# Patient Record
Sex: MALE | Race: WHITE | Employment: OTHER | URBAN - METROPOLITAN AREA
[De-identification: names, ages, dates, MRNs, and addresses within clinical notes are randomized per-mention and may not be internally consistent; named-entity substitution may affect disease eponyms.]

---

## 2018-04-05 PROBLEM — J44.9 CHRONIC OBSTRUCTIVE PULMONARY DISEASE, UNSPECIFIED COPD TYPE (HCC): Status: ACTIVE | Noted: 2018-04-05

## 2018-05-03 PROBLEM — E55.9 VITAMIN D DEFICIENCY: Status: ACTIVE | Noted: 2018-05-03

## 2018-05-03 PROBLEM — R97.20 ELEVATED PSA: Status: ACTIVE | Noted: 2018-05-03

## 2018-05-03 PROCEDURE — 81003 URINALYSIS AUTO W/O SCOPE: CPT | Performed by: INTERNAL MEDICINE

## 2018-07-09 ENCOUNTER — LAB ENCOUNTER (OUTPATIENT)
Dept: LAB | Facility: HOSPITAL | Age: 68
End: 2018-07-09
Attending: INTERNAL MEDICINE
Payer: MEDICARE

## 2018-07-09 DIAGNOSIS — K29.00 GASTRITIS, ACUTE: ICD-10-CM

## 2018-07-09 DIAGNOSIS — D64.9 ANEMIA, UNSPECIFIED: ICD-10-CM

## 2018-07-09 DIAGNOSIS — R10.13 EPIGASTRIC PAIN: ICD-10-CM

## 2018-07-09 LAB — LIPASE SERPL-CCNC: 39 U/L (ref 22–51)

## 2018-07-09 PROCEDURE — 83690 ASSAY OF LIPASE: CPT

## 2018-07-09 PROCEDURE — 36415 COLL VENOUS BLD VENIPUNCTURE: CPT

## 2018-07-10 PROBLEM — M54.41 MIDLINE LOW BACK PAIN WITH BILATERAL SCIATICA, UNSPECIFIED CHRONICITY: Status: ACTIVE | Noted: 2018-07-10

## 2018-07-10 PROBLEM — R29.898 WEAKNESS OF BOTH LOWER EXTREMITIES: Status: ACTIVE | Noted: 2018-07-10

## 2018-07-10 PROBLEM — M54.42 MIDLINE LOW BACK PAIN WITH BILATERAL SCIATICA, UNSPECIFIED CHRONICITY: Status: ACTIVE | Noted: 2018-07-10

## 2018-08-04 ENCOUNTER — LAB ENCOUNTER (OUTPATIENT)
Dept: LAB | Facility: HOSPITAL | Age: 68
End: 2018-08-04
Attending: INTERNAL MEDICINE
Payer: MEDICARE

## 2018-08-04 DIAGNOSIS — R19.4 CHANGE IN BOWEL HABITS: ICD-10-CM

## 2018-08-04 LAB
CRYPTOSP AG STL QL IA: NEGATIVE
G LAMBLIA AG STL QL IA: NEGATIVE

## 2018-08-04 PROCEDURE — 87272 CRYPTOSPORIDIUM AG IF: CPT

## 2018-08-04 PROCEDURE — 87329 GIARDIA AG IA: CPT

## 2018-08-06 NOTE — PROGRESS NOTES
8/6/2018  Anabelle Pedro  [address] [facility] [place] [address]    Dear Federico Domingo,       Here are your results from your recent visit with Gastroenterology. Stool for giardia and crytosporidium is negative. Follow up as discussed.

## 2018-08-07 ENCOUNTER — HOSPITAL ENCOUNTER (OUTPATIENT)
Facility: HOSPITAL | Age: 68
Setting detail: HOSPITAL OUTPATIENT SURGERY
Discharge: HOME | End: 2018-08-07
Attending: INTERNAL MEDICINE | Admitting: INTERNAL MEDICINE
Payer: MEDICARE

## 2018-08-07 ENCOUNTER — ANESTHESIA EVENT (OUTPATIENT)
Dept: ENDOSCOPY | Facility: HOSPITAL | Age: 68
End: 2018-08-07
Payer: MEDICARE

## 2018-08-07 ENCOUNTER — SURGERY (OUTPATIENT)
Age: 68
End: 2018-08-07

## 2018-08-07 ENCOUNTER — ANESTHESIA (OUTPATIENT)
Dept: ENDOSCOPY | Facility: HOSPITAL | Age: 68
End: 2018-08-07
Payer: MEDICARE

## 2018-08-07 DIAGNOSIS — K21.9 GASTROESOPHAGEAL REFLUX DISEASE WITHOUT ESOPHAGITIS: Primary | ICD-10-CM

## 2018-08-07 DIAGNOSIS — Z12.11 SPECIAL SCREENING FOR MALIGNANT NEOPLASM OF COLON: ICD-10-CM

## 2018-08-07 DIAGNOSIS — R68.81 EARLY SATIETY: ICD-10-CM

## 2018-08-07 DIAGNOSIS — K63.5 POLYP OF COLON, UNSPECIFIED PART OF COLON, UNSPECIFIED TYPE: ICD-10-CM

## 2018-08-07 DIAGNOSIS — R10.13 EPIGASTRIC PAIN: ICD-10-CM

## 2018-08-07 DIAGNOSIS — R19.4 CHANGE IN BOWEL HABITS: ICD-10-CM

## 2018-08-07 PROCEDURE — 88305 TISSUE EXAM BY PATHOLOGIST: CPT | Performed by: INTERNAL MEDICINE

## 2018-08-07 PROCEDURE — 3E0H8GC INTRODUCTION OF OTHER THERAPEUTIC SUBSTANCE INTO LOWER GI, VIA NATURAL OR ARTIFICIAL OPENING ENDOSCOPIC: ICD-10-PCS | Performed by: INTERNAL MEDICINE

## 2018-08-07 PROCEDURE — 0DBK8ZX EXCISION OF ASCENDING COLON, VIA NATURAL OR ARTIFICIAL OPENING ENDOSCOPIC, DIAGNOSTIC: ICD-10-PCS | Performed by: INTERNAL MEDICINE

## 2018-08-07 PROCEDURE — 88312 SPECIAL STAINS GROUP 1: CPT | Performed by: INTERNAL MEDICINE

## 2018-08-07 PROCEDURE — 0DBB8ZX EXCISION OF ILEUM, VIA NATURAL OR ARTIFICIAL OPENING ENDOSCOPIC, DIAGNOSTIC: ICD-10-PCS | Performed by: INTERNAL MEDICINE

## 2018-08-07 PROCEDURE — 0DB68ZX EXCISION OF STOMACH, VIA NATURAL OR ARTIFICIAL OPENING ENDOSCOPIC, DIAGNOSTIC: ICD-10-PCS | Performed by: INTERNAL MEDICINE

## 2018-08-07 PROCEDURE — 0DBN8ZX EXCISION OF SIGMOID COLON, VIA NATURAL OR ARTIFICIAL OPENING ENDOSCOPIC, DIAGNOSTIC: ICD-10-PCS | Performed by: INTERNAL MEDICINE

## 2018-08-07 PROCEDURE — 0DBM8ZX EXCISION OF DESCENDING COLON, VIA NATURAL OR ARTIFICIAL OPENING ENDOSCOPIC, DIAGNOSTIC: ICD-10-PCS | Performed by: INTERNAL MEDICINE

## 2018-08-07 PROCEDURE — 0DB98ZX EXCISION OF DUODENUM, VIA NATURAL OR ARTIFICIAL OPENING ENDOSCOPIC, DIAGNOSTIC: ICD-10-PCS | Performed by: INTERNAL MEDICINE

## 2018-08-07 PROCEDURE — 0DB48ZX EXCISION OF ESOPHAGOGASTRIC JUNCTION, VIA NATURAL OR ARTIFICIAL OPENING ENDOSCOPIC, DIAGNOSTIC: ICD-10-PCS | Performed by: INTERNAL MEDICINE

## 2018-08-07 PROCEDURE — 0DBL8ZX EXCISION OF TRANSVERSE COLON, VIA NATURAL OR ARTIFICIAL OPENING ENDOSCOPIC, DIAGNOSTIC: ICD-10-PCS | Performed by: INTERNAL MEDICINE

## 2018-08-07 PROCEDURE — 0DBE8ZX EXCISION OF LARGE INTESTINE, VIA NATURAL OR ARTIFICIAL OPENING ENDOSCOPIC, DIAGNOSTIC: ICD-10-PCS | Performed by: INTERNAL MEDICINE

## 2018-08-07 RX ORDER — LIDOCAINE HYDROCHLORIDE 10 MG/ML
INJECTION, SOLUTION EPIDURAL; INFILTRATION; INTRACAUDAL; PERINEURAL AS NEEDED
Status: DISCONTINUED | OUTPATIENT
Start: 2018-08-07 | End: 2018-08-07 | Stop reason: SURG

## 2018-08-07 RX ORDER — SODIUM CHLORIDE, SODIUM LACTATE, POTASSIUM CHLORIDE, CALCIUM CHLORIDE 600; 310; 30; 20 MG/100ML; MG/100ML; MG/100ML; MG/100ML
INJECTION, SOLUTION INTRAVENOUS CONTINUOUS
Status: DISCONTINUED | OUTPATIENT
Start: 2018-08-07 | End: 2018-08-07

## 2018-08-07 RX ADMIN — SODIUM CHLORIDE, SODIUM LACTATE, POTASSIUM CHLORIDE, CALCIUM CHLORIDE: 600; 310; 30; 20 INJECTION, SOLUTION INTRAVENOUS at 11:33:00

## 2018-08-07 RX ADMIN — LIDOCAINE HYDROCHLORIDE 20 MG: 10 INJECTION, SOLUTION EPIDURAL; INFILTRATION; INTRACAUDAL; PERINEURAL at 10:39:00

## 2018-08-07 RX ADMIN — SODIUM CHLORIDE, SODIUM LACTATE, POTASSIUM CHLORIDE, CALCIUM CHLORIDE: 600; 310; 30; 20 INJECTION, SOLUTION INTRAVENOUS at 10:37:00

## 2018-08-07 NOTE — ANESTHESIA POSTPROCEDURE EVALUATION
Patient: Emily Stone    Procedure Summary     Date:  08/07/18 Room / Location:  [address] [place] [address] ENDOSCOPY 01 / [address] [place] [address] ENDOSCOPY    Anesthesia Start:  8655 Anesthesia Stop:  1503    Procedures:       COLONOSCOPY (N/A )      ESOPHAGOGASTRODUODENOSCOPY (EGD) (N/A

## 2018-08-07 NOTE — OPERATIVE REPORT
COLONOSCOPY AND ESOPHAGOGASTRODUODENOSCOPY REPORT    Patient Name:  Osito Acosta  Medical Record #: [id number]  YOB: 1950  Date of Procedure: 8/7/2018    Referring physician: Jannie Warner MD    Surgeon: Marcelle Martinez MD second portion of the duodenum. Upon withdrawal of the scope, a retroflexed maneuver was performed to visualize the gastric angulus and cardia. Findings: The esophagus was noted to have distal erythema and irregular z line and biopsies were taken.  No

## 2018-08-07 NOTE — H&P
PRE-PROCEDURE UPDATE    HPI: Kym Colindres is a 79year old male. 8/8/1950. Patient presents for a colonoscopy and gastroscopy. ALLERGIES: No Known Allergies      No current outpatient prescriptions on file.   Past Medical History:   Diagnos

## 2018-08-07 NOTE — ANESTHESIA PREPROCEDURE EVALUATION
Anesthesia PreOp Note    HPI:     Anabelle Pedro is a 79year old male who presents for preoperative consultation requested by: Madyson Nazario MD    Date of Surgery: 8/7/2018    Procedure(s):  COLONOSCOPY  ESOPHAGOGASTRODUODENOSCOPY (EGD)  Camelia lisinopril 20 MG Oral Tab Take 1 tablet (20 mg total) by mouth daily. Disp: 90 tablet Rfl: 0 8/7/2018   aspirin 81 MG Oral Tab Take 81 mg by mouth daily. Disp:  Rfl:  8/6/2018   metoprolol Tartrate 25 MG Oral Tab Take 25 mg by mouth daily.  Disp:  Rfl:  8/7 Patient summary reviewed    No history of anesthetic complications   Airway   Mallampati: III  TM distance: >3 FB  Neck ROM: full  Dental          Pulmonary    (+) decreased breath sounds,   (-) COPD  Cardiovascular   (+) hypertension well controlled, pas

## 2018-08-08 VITALS
DIASTOLIC BLOOD PRESSURE: 73 MMHG | HEIGHT: 73 IN | SYSTOLIC BLOOD PRESSURE: 128 MMHG | HEART RATE: 70 BPM | RESPIRATION RATE: 15 BRPM | WEIGHT: 220 LBS | OXYGEN SATURATION: 99 % | BODY MASS INDEX: 29.16 KG/M2

## 2018-08-09 NOTE — PROGRESS NOTES
8/9/2018  Love Marcum  [phone] [address]    Dear Prerna De La O,   I called and left a message to call me back as soon as you can today.   [phone]      Here are the biopsy/pathology findings from your recent EGD (upper

## 2018-09-21 ENCOUNTER — HOSPITAL ENCOUNTER (OUTPATIENT)
Facility: HOSPITAL | Age: 68
Setting detail: HOSPITAL OUTPATIENT SURGERY
Discharge: HOME | End: 2018-09-21
Attending: INTERNAL MEDICINE | Admitting: INTERNAL MEDICINE
Payer: MEDICARE

## 2018-09-21 VITALS
HEART RATE: 61 BPM | HEIGHT: 73 IN | BODY MASS INDEX: 28.36 KG/M2 | DIASTOLIC BLOOD PRESSURE: 73 MMHG | RESPIRATION RATE: 17 BRPM | WEIGHT: 214 LBS | SYSTOLIC BLOOD PRESSURE: 132 MMHG | OXYGEN SATURATION: 95 %

## 2018-09-21 DIAGNOSIS — C18.7 MALIGNANT NEOPLASM OF SIGMOID COLON (HCC): ICD-10-CM

## 2018-09-21 PROCEDURE — 0DJD8ZZ INSPECTION OF LOWER INTESTINAL TRACT, VIA NATURAL OR ARTIFICIAL OPENING ENDOSCOPIC: ICD-10-PCS | Performed by: INTERNAL MEDICINE

## 2018-09-21 RX ORDER — SODIUM CHLORIDE 0.9 % (FLUSH) 0.9 %
10 SYRINGE (ML) INJECTION AS NEEDED
Status: DISCONTINUED | OUTPATIENT
Start: 2018-09-21 | End: 2018-09-21

## 2018-09-21 RX ORDER — SODIUM CHLORIDE, SODIUM LACTATE, POTASSIUM CHLORIDE, CALCIUM CHLORIDE 600; 310; 30; 20 MG/100ML; MG/100ML; MG/100ML; MG/100ML
INJECTION, SOLUTION INTRAVENOUS CONTINUOUS
Status: DISCONTINUED | OUTPATIENT
Start: 2018-09-21 | End: 2018-09-21

## 2018-09-21 NOTE — H&P
PRE-PROCEDURE HISTORY & PHYSICAL    HPI: Christian Jha is a 76year old male. 8/8/1950.     Patient presents for a Flexible Sigmoidoscopy with EUS for rectal polyp with CIS  ALLERGIES: No Known Allergies      No current outpatient medications on fi

## 2018-09-21 NOTE — OPERATIVE REPORT
SIGMOIDOSCOPY REPORT    Patient Name:  Rylan Ren  Medical Record #: [id number]  YOB: 1950  Date of Procedure: 9/21/2018    Referring physician: Ramon Price MD     Flexible sigmoidoscopy to 40 cm with EUS    Surgeon:  Aileen Cardona

## 2018-10-12 PROCEDURE — 87338 HPYLORI STOOL AG IA: CPT | Performed by: INTERNAL MEDICINE

## 2018-10-19 PROBLEM — C19 CARCINOMA OF RECTOSIGMOID (COLON) (HCC): Status: ACTIVE | Noted: 2018-10-19

## 2018-11-20 PROCEDURE — 87493 C DIFF AMPLIFIED PROBE: CPT | Performed by: SURGERY

## 2019-01-11 ENCOUNTER — HOSPITAL (OUTPATIENT)
Dept: OTHER | Age: 69
End: 2019-01-11
Attending: INTERNAL MEDICINE

## 2019-01-17 LAB — PATHOLOGIST NAME: NORMAL

## 2019-05-23 PROBLEM — I25.10 CORONARY ARTERY DISEASE INVOLVING NATIVE HEART WITHOUT ANGINA PECTORIS, UNSPECIFIED VESSEL OR LESION TYPE: Status: ACTIVE | Noted: 2019-05-23

## 2019-06-13 PROBLEM — N17.9 AKI (ACUTE KIDNEY INJURY) (HCC): Status: ACTIVE | Noted: 2019-06-13

## 2019-06-13 PROBLEM — N17.9 AKI (ACUTE KIDNEY INJURY): Status: ACTIVE | Noted: 2019-06-13

## 2019-06-13 PROBLEM — Z93.2 ILEOSTOMY PRESENT (HCC): Status: ACTIVE | Noted: 2019-06-13

## 2019-06-13 PROCEDURE — 84300 ASSAY OF URINE SODIUM: CPT | Performed by: INTERNAL MEDICINE

## 2019-06-21 ENCOUNTER — HOSPITAL ENCOUNTER (INPATIENT)
Facility: HOSPITAL | Age: 69
LOS: 3 days | Discharge: HOME HEALTH | DRG: 683 | End: 2019-06-24
Attending: EMERGENCY MEDICINE | Admitting: HOSPITALIST
Payer: MEDICARE

## 2019-06-21 ENCOUNTER — APPOINTMENT (OUTPATIENT)
Dept: CT IMAGING | Facility: HOSPITAL | Age: 69
DRG: 683 | End: 2019-06-21
Attending: INTERNAL MEDICINE
Payer: MEDICARE

## 2019-06-21 ENCOUNTER — APPOINTMENT (OUTPATIENT)
Dept: ULTRASOUND IMAGING | Facility: HOSPITAL | Age: 69
DRG: 683 | End: 2019-06-21
Attending: EMERGENCY MEDICINE
Payer: MEDICARE

## 2019-06-21 DIAGNOSIS — N30.00 ACUTE CYSTITIS WITHOUT HEMATURIA: Primary | ICD-10-CM

## 2019-06-21 PROCEDURE — 84540 ASSAY OF URINE/UREA-N: CPT | Performed by: INTERNAL MEDICINE

## 2019-06-21 PROCEDURE — 80048 BASIC METABOLIC PNL TOTAL CA: CPT | Performed by: EMERGENCY MEDICINE

## 2019-06-21 PROCEDURE — 87086 URINE CULTURE/COLONY COUNT: CPT | Performed by: EMERGENCY MEDICINE

## 2019-06-21 PROCEDURE — 99285 EMERGENCY DEPT VISIT HI MDM: CPT

## 2019-06-21 PROCEDURE — 87040 BLOOD CULTURE FOR BACTERIA: CPT | Performed by: HOSPITALIST

## 2019-06-21 PROCEDURE — 81001 URINALYSIS AUTO W/SCOPE: CPT | Performed by: EMERGENCY MEDICINE

## 2019-06-21 PROCEDURE — 96374 THER/PROPH/DIAG INJ IV PUSH: CPT

## 2019-06-21 PROCEDURE — 82570 ASSAY OF URINE CREATININE: CPT | Performed by: INTERNAL MEDICINE

## 2019-06-21 PROCEDURE — 84300 ASSAY OF URINE SODIUM: CPT | Performed by: INTERNAL MEDICINE

## 2019-06-21 PROCEDURE — 85025 COMPLETE CBC W/AUTO DIFF WBC: CPT | Performed by: EMERGENCY MEDICINE

## 2019-06-21 PROCEDURE — 76770 US EXAM ABDO BACK WALL COMP: CPT | Performed by: EMERGENCY MEDICINE

## 2019-06-21 PROCEDURE — 74176 CT ABD & PELVIS W/O CONTRAST: CPT | Performed by: INTERNAL MEDICINE

## 2019-06-21 PROCEDURE — 83605 ASSAY OF LACTIC ACID: CPT | Performed by: HOSPITALIST

## 2019-06-21 RX ORDER — ALPRAZOLAM 0.25 MG/1
0.25 TABLET ORAL 2 TIMES DAILY PRN
Status: DISCONTINUED | OUTPATIENT
Start: 2019-06-21 | End: 2019-06-24

## 2019-06-21 RX ORDER — BISACODYL 10 MG
10 SUPPOSITORY, RECTAL RECTAL
Status: DISCONTINUED | OUTPATIENT
Start: 2019-06-21 | End: 2019-06-24

## 2019-06-21 RX ORDER — SODIUM CHLORIDE 9 MG/ML
125 INJECTION, SOLUTION INTRAVENOUS CONTINUOUS
Status: DISCONTINUED | OUTPATIENT
Start: 2019-06-21 | End: 2019-06-21

## 2019-06-21 RX ORDER — SODIUM CHLORIDE 0.9 % (FLUSH) 0.9 %
3 SYRINGE (ML) INJECTION AS NEEDED
Status: DISCONTINUED | OUTPATIENT
Start: 2019-06-21 | End: 2019-06-24

## 2019-06-21 RX ORDER — ATORVASTATIN CALCIUM 40 MG/1
40 TABLET, FILM COATED ORAL NIGHTLY
Status: DISCONTINUED | OUTPATIENT
Start: 2019-06-21 | End: 2019-06-24

## 2019-06-21 RX ORDER — PANTOPRAZOLE SODIUM 40 MG/1
40 TABLET, DELAYED RELEASE ORAL
Status: DISCONTINUED | OUTPATIENT
Start: 2019-06-21 | End: 2019-06-24

## 2019-06-21 RX ORDER — DIPHENOXYLATE HYDROCHLORIDE AND ATROPINE SULFATE 2.5; .025 MG/1; MG/1
1 TABLET ORAL EVERY 6 HOURS PRN
Status: DISCONTINUED | OUTPATIENT
Start: 2019-06-21 | End: 2019-06-24

## 2019-06-21 RX ORDER — DOCUSATE SODIUM 100 MG/1
100 CAPSULE, LIQUID FILLED ORAL 2 TIMES DAILY
Status: DISCONTINUED | OUTPATIENT
Start: 2019-06-21 | End: 2019-06-24

## 2019-06-21 RX ORDER — POLYETHYLENE GLYCOL 3350 17 G/17G
17 POWDER, FOR SOLUTION ORAL DAILY PRN
Status: DISCONTINUED | OUTPATIENT
Start: 2019-06-21 | End: 2019-06-24

## 2019-06-21 RX ORDER — METOCLOPRAMIDE HYDROCHLORIDE 5 MG/ML
10 INJECTION INTRAMUSCULAR; INTRAVENOUS EVERY 8 HOURS PRN
Status: DISCONTINUED | OUTPATIENT
Start: 2019-06-21 | End: 2019-06-24

## 2019-06-21 RX ORDER — DIPHENOXYLATE HYDROCHLORIDE AND ATROPINE SULFATE 2.5; .025 MG/1; MG/1
1 TABLET ORAL EVERY 6 HOURS PRN
Refills: 2 | COMMUNITY
Start: 2019-06-07 | End: 2019-10-25

## 2019-06-21 RX ORDER — HEPARIN SODIUM 5000 [USP'U]/ML
5000 INJECTION, SOLUTION INTRAVENOUS; SUBCUTANEOUS EVERY 8 HOURS SCHEDULED
Status: DISCONTINUED | OUTPATIENT
Start: 2019-06-21 | End: 2019-06-24

## 2019-06-21 RX ORDER — ACETAMINOPHEN 325 MG/1
650 TABLET ORAL EVERY 6 HOURS PRN
Status: DISCONTINUED | OUTPATIENT
Start: 2019-06-21 | End: 2019-06-24

## 2019-06-21 RX ORDER — ONDANSETRON 2 MG/ML
4 INJECTION INTRAMUSCULAR; INTRAVENOUS EVERY 6 HOURS PRN
Status: DISCONTINUED | OUTPATIENT
Start: 2019-06-21 | End: 2019-06-24

## 2019-06-21 RX ORDER — SODIUM CHLORIDE 9 MG/ML
INJECTION, SOLUTION INTRAVENOUS CONTINUOUS
Status: DISCONTINUED | OUTPATIENT
Start: 2019-06-21 | End: 2019-06-24

## 2019-06-21 NOTE — ED NOTES
76year old male here send by PMD for admission, pt reports had colon resection and colostomy on 5/28 at OSH, pt reports urine infection was on antibiotics called by PMD to come to ED for admission due to infection in urine

## 2019-06-21 NOTE — CM/SW NOTE
SW completed chart review. Pt with recent hospitalization at [facility] for colon resection with ostomy. Pt was arranged with [facility]) for RN services. They are aware of the pt's rehospitalization.  Updated clinicals will be sent once av

## 2019-06-21 NOTE — ED INITIAL ASSESSMENT (HPI)
Pt here today stating Dr Jack Code sent him in evaluation of UTI.  Pt had colorectal resection 5/28 and states \"nothing has felt the same since\"

## 2019-06-21 NOTE — H&P
[facility] Hospitalist H&P     CC: Patient presents with:  Urinary Symptoms (urologic)       PCP: Shanta Kelly.  Nadege Laguna MD      Assessment and Plan     Jonathan Monson is a 76year old male with PMH sig for atherosclerosis of arota, PAD with neuropathy, c outlined    Thank Emilia Shannon MD  [facility] Hospitalist    Answering Service number: [phone]    HPI     Dg Bajwa is a 76year old male with PMH sig for atherosclerosis of arota, PAD with neuropathy, carotid stenosis b/l, GERD, CAD s/ ESOPHAGOGASTRODUODENOSCOPY (EGD) N/A 8/7/2018    Performed by Yi Lofton MD at [address] [place] [address] N/A 9/21/2018    Performed by Yi Lofton MD at [facility] ENDOSCOPY   • Banner input(s): ALPHOS, TBIL, DBIL, TPROT    No results for input(s): TROP in the last 168 hours. Radiology: No results found.

## 2019-06-21 NOTE — PROGRESS NOTES
[facility] Dosing Service    Initial Pharmacokinetic Consult for Vancomycin Dosing     Ovi Hairston is a 76year old male who is being treated for UTI. Pharmacy has been asked to dose Vancomycin by Dr. Azam Randolph. He has No Known Allergies.

## 2019-06-21 NOTE — CONSULTS
NEPHROLOGY CONSULT NOTE     DATE: 6/21/2019    Requesting Physician:  Dr. Tono Hensley     Reason for Consult: ABBY     HISTORY OF PRESENT ILLNESS: Shereen Miller is a 76year old male with history of  HTN, CAD, recent resection for carcinoma of the re HISTORY: Social History    Socioeconomic History      Marital status: Legally       Spouse name: Not on file      Number of children: Not on file      Years of education: Not on file      Highest education level: Not on file    Occupational Histor 5,000 Units 5,000 Units Subcutaneous Q8H [address]   docusate sodium (COLACE) cap 100 mg 100 mg Oral BID   PEG 3350 (MIRALAX) powder packet 17 g 17 g Oral Daily PRN   bisacodyl (DULCOLAX) rectal suppository 10 mg 10 mg Rectal Daily PRN   ondansetron HCl (ZOFRAN) b/l  CARDIAC: S1S2 normal  ABD: soft, NT/ND, ostomy   EXT: warm/well perfused, no lower ext edema  NEURO: awake, alert, moving all four extremities.    SKIN: warm, dry, no jaundice   PSYCH: mood appropriate       LABS:  Lab Results   Component Value Date

## 2019-06-22 PROCEDURE — 87507 IADNA-DNA/RNA PROBE TQ 12-25: CPT | Performed by: INTERNAL MEDICINE

## 2019-06-22 PROCEDURE — 97165 OT EVAL LOW COMPLEX 30 MIN: CPT

## 2019-06-22 PROCEDURE — 80053 COMPREHEN METABOLIC PANEL: CPT | Performed by: HOSPITALIST

## 2019-06-22 PROCEDURE — 97535 SELF CARE MNGMENT TRAINING: CPT

## 2019-06-22 RX ORDER — ALFUZOSIN HYDROCHLORIDE 10 MG/1
10 TABLET, EXTENDED RELEASE ORAL
Status: DISCONTINUED | OUTPATIENT
Start: 2019-06-22 | End: 2019-06-24

## 2019-06-22 NOTE — ED PROVIDER NOTES
Patient Seen in: [facility] AND CLINICS 4w/sw/se    History   Patient presents with:  Urinary Symptoms (urologic)    Stated Complaint: Acute cystitis without hematuria    HPI    76year old male with multiple medical problems including CAD, hypertension, high Performed by Sergio Rodarte MD at [address] [place] [address] ENDOSCOPY   • ESOPHAGOGASTRODUODENOSCOPY (EGD) N/A 8/7/2018    Performed by Sergio Rodarte MD at [address] [place] [address] N/A 9/21/2018    Performed by Sergio Rodarte MD at [address] [place] [address] ENDOSCOPY Cardiovascular: Normal rate, regular rhythm, normal heart sounds and intact distal pulses. No murmur heard. Pulmonary/Chest: Effort normal and breath sounds normal. No respiratory distress. He has no wheezes. Abdominal: Soft.  He exhibits no distension CBC W/ DIFFERENTIAL[[id number]]          Abnormal            Final result                 Please view results for these tests on the individual orders.    SODIUM, URINE, RANDOM   CREATININE, URINE, RANDOM   UREA NITROGEN, U   RAINBOW DRAW BLUE   RAINBOW DRAW

## 2019-06-22 NOTE — CONSULTS
[facility] AND CLINICS  Report of Urology Consultation    Sudha Naik Patient Status:  Inpatient    1950 MRN [id number]   Location [facility] 4W/SW/SE Attending Arias Meeks MD   Hosp Day # 1 PCP Jemal Banda.  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   Test reordered by lab present [facility])     Acute cystitis without hematuria      UTI  BPH    Recommendations:  Continue antibiotics for his UTI per ID. We will put him on alfuzosin for his BPH and voiding. Thank you for allowing me to participate in the care of your patient.

## 2019-06-22 NOTE — OCCUPATIONAL THERAPY NOTE
OCCUPATIONAL THERAPY EVALUATION - INPATIENT     Room Number: 449/449-A  Evaluation Date: 6/22/2019  Type of Evaluation: Initial  Presenting Problem: (Acute cystitis without hemorrhage )    Physician Order: IP Consult to Occupational Therapy  Reason for The N/A 9/21/2018    Performed by Milton Richards MD at [facility] ENDOSCOPY   • ESOPHAGOGASTRODUODENOSCOPY (EGD) N/A 8/7/2018    Performed by Milton Richards MD at [address] [place] [address] N/A 9/21/2018    Performed by Milton Richards MD at regular lower body clothing?: None  -   Bathing (including washing, rinsing, drying)?: A Little  -   Toileting, which includes using toilet, bedpan or urinal? : None  -   Putting on and taking off regular upper body clothing?: None  -   Taking care of pers

## 2019-06-22 NOTE — PROGRESS NOTES
[facility]    Progress Note    James Reid Patient Status:  Inpatient    1950 MRN [id number]   Location [facility] 4W/SW/SE Attending Jamie Gomez MD   Hosp Day # 1 PCP Kip Parks.  Isarel Welsh MD       Subjective: which may reflect cystitis. Coronary artery calcifications. Prostatomegaly. Dictated by (CST): Annemarie Green MD on 6/21/2019 at 18:22     Approved by (CST): Annemarie Green MD on 6/21/2019 at 18:36                        Domonique Westbrook.  Kia Magana MD  6/22/20

## 2019-06-22 NOTE — PROGRESS NOTES
[facility] AND [facility] Infectious Disease Progress Note    Sam Brennan Patient Status:  Inpatient    1950 MRN [id number]   Location [facility] 4W/SW/SE Attending Srinath Zarate MD   Hosp Day # 1 PCP Venessa Garg.  Lloyd Gay, [address] °[address])      HEENT: Exam is unremarkable. Without scleral icterus. Mucous membranes are moist. PERRLA. Oropharynx is clear. Neck: No tenderness to palpitation.   Full range of motion to flexion and extension, lateral rotation and lateral flexion of cervical

## 2019-06-22 NOTE — PROGRESS NOTES
[facility] Hospitalist Progress Note     PCP: Abena Covington.  Erick Butcher MD    CC: Follow up       Assessment/Plan:     Anisha Rivero is a 76year old male with PMH sig for atherosclerosis of arota, PAD with neuropathy, carotid stenosis b/l, GERD, CAD s/p pr agreeing with therapeutic plan as outlined     Thank Zee Castro     Answering Service number: [phone]    Subjective     No new complaints. Still feels that pressure below. No fevers. No CP, SOB, or N/V.       Objective Recent Labs   Lab 06/18/19 06/21/19  1030 06/22/19  0841 06/22/19  1037   * 136 141  --    K 5.0 4.6  --  4.2   CL 96* 103 111  --    CO2 22.0 23.0 24.0  --    BUN 33.0* 27* 16  --    CREATSERUM 2.25 1.87* 1.45*  --    CA  --  8.9 9.2  --    GL

## 2019-06-22 NOTE — PLAN OF CARE
Problem: Patient Centered Care  Goal: Patient preferences are identified and integrated in the patient's plan of care  Description  Interventions:  - What would you like us to know as we care for you?  I had a colostomy in May of this year  - Provide time patient's medication profile  - Implement strategies to promote bladder emptying  Outcome: Progressing     Problem: METABOLIC/FLUID AND ELECTROLYTES - ADULT  Goal: Electrolytes maintained within normal limits  Description  INTERVENTIONS:  - Monitor labs an

## 2019-06-23 PROCEDURE — 80048 BASIC METABOLIC PNL TOTAL CA: CPT | Performed by: HOSPITALIST

## 2019-06-23 PROCEDURE — 97162 PT EVAL MOD COMPLEX 30 MIN: CPT

## 2019-06-23 PROCEDURE — 85025 COMPLETE CBC W/AUTO DIFF WBC: CPT | Performed by: HOSPITALIST

## 2019-06-23 PROCEDURE — 97116 GAIT TRAINING THERAPY: CPT

## 2019-06-23 NOTE — PROGRESS NOTES
[facility]    Progress Note    Kym So Patient Status:  Inpatient    1950 MRN [id number]   Location [facility] 4W/SW/SE Attending Hillary Schaeffer MD   Hosp Day # 2 PCP Imtiaz Boyer.  Gregg Jang MD       Subjective: may reflect cystitis. Coronary artery calcifications. Prostatomegaly. Dictated by (CST): Alanna Du MD on 6/21/2019 at 18:22     Approved by (CST): Alanna Du MD on 6/21/2019 at 18:36                        Lucrecia Rooney.  Iwona Kwon MD  6/22/2019

## 2019-06-23 NOTE — PLAN OF CARE
Marcela Essex has had no complaints throughout the day. VSS. Up independently. Vanco given. Tolerating diet. Colostomy with adequate output. Plan to be discharged home tomorrow.    Problem: Patient Centered Care  Goal: Patient preferences are identified and integrat bladder  - Monitor intake/output and perform bladder scan as needed  - Follow urinary retention protocol/standard of care  - Consider collaborating with pharmacy to review patient's medication profile  - Implement strategies to promote bladder emptying  Phyllis Casarez

## 2019-06-23 NOTE — PROGRESS NOTES
[facility] AND [facility] Infectious Disease  Progress Note    Shereen Miller Patient Status:  Inpatient    1950 MRN [id number]   Location [facility] 4W/SW/SE Attending Josafat Mancuso MD   Hosp Day # 2 PCP Sonu Varghese MD infection/abscess, and or anastomotic leak not excluded. Perivesicular stranding which may reflect cystitis. Coronary artery calcifications. Prostatomegaly. Antibiotics Reviewed:  Vancomycin    Assessment and Plan:    1.   MRSA UTI after re

## 2019-06-23 NOTE — PHYSICAL THERAPY NOTE
PHYSICAL THERAPY EVALUATION - INPATIENT     Room Number: 449/449-A  Evaluation Date: 6/23/2019  Type of Evaluation: Initial   Physician Order: PT Eval and Treat    Presenting Problem: acute cysitis without hematuria  Reason for Therapy: Mobility Dysfuncti colon 1 yr   • COLONOSCOPY N/A 8/7/2018    Performed by Thierry Blair MD at [place] [address]. (EUS) N/A 9/21/2018    Performed by Thierry Blair MD at [facility] ENDOSCOPY   • ESOPHAGOGASTRODUODENOSCOPY (EGD) N/A 8/7/2018    Perform another person does the patient currently need. ..   -   Moving to and from a bed to a chair (including a wheelchair)?: None   -   Need to walk in hospital room?: A Little   -   Climbing 3-5 steps with a railing?: A Little     AM-PAC Score:  Raw Score: 22

## 2019-06-23 NOTE — PROGRESS NOTES
[facility]  Urology Progress Note    Griseldashelbi Dukes Patient Status:  Inpatient    1950 MRN [id number]   Location [facility] 4W/SW/SE Attending Seble Heath MD   Hosp Day # 2 PCP Mateo Terry.  Dennis Leiva MD     Subjective:  Vyt

## 2019-06-23 NOTE — PROGRESS NOTES
[facility] Hospitalist Progress Note     PCP: Marie Chavarria.  Luis Prado MD    CC: Follow up       Assessment/Plan:     Jumana Darby is a 76year old male with PMH sig for atherosclerosis of arota, PAD with neuropathy, carotid stenosis b/l, GERD, CAD s/p pr ask questions and note understanding and agreeing with therapeutic plan as outlined     Thank Alexandre Castro     Answering Service number: [phone]    Subjective     Overall feels better, still feels pressure in rectum presen WBC 9.65 11.1* 5.7   HGB 12.9 12.2* 10.8*   MCV  --  97.8 99.1    241.0 238.0       Recent Labs   Lab 06/18/19 06/21/19  1030 06/22/19  0841 06/22/19  1037 06/23/19  0507   * 136 141  --  142   K 5.0 4.6  --  4.2 4.1   CL 96* 103 111  --  11

## 2019-06-24 VITALS
RESPIRATION RATE: 18 BRPM | TEMPERATURE: 98 F | SYSTOLIC BLOOD PRESSURE: 148 MMHG | WEIGHT: 218.5 LBS | OXYGEN SATURATION: 96 % | BODY MASS INDEX: 28.96 KG/M2 | DIASTOLIC BLOOD PRESSURE: 74 MMHG | HEIGHT: 73 IN | HEART RATE: 77 BPM

## 2019-06-24 PROCEDURE — 80202 ASSAY OF VANCOMYCIN: CPT | Performed by: HOSPITALIST

## 2019-06-24 PROCEDURE — 80069 RENAL FUNCTION PANEL: CPT | Performed by: INTERNAL MEDICINE

## 2019-06-24 PROCEDURE — 99212 OFFICE O/P EST SF 10 MIN: CPT

## 2019-06-24 PROCEDURE — 83735 ASSAY OF MAGNESIUM: CPT | Performed by: INTERNAL MEDICINE

## 2019-06-24 RX ORDER — ALFUZOSIN HYDROCHLORIDE 10 MG/1
10 TABLET, EXTENDED RELEASE ORAL
Qty: 30 TABLET | Refills: 0 | Status: SHIPPED | OUTPATIENT
Start: 2019-06-25 | End: 2019-07-17

## 2019-06-24 RX ORDER — HYDRALAZINE HYDROCHLORIDE 20 MG/ML
10 INJECTION INTRAMUSCULAR; INTRAVENOUS EVERY 6 HOURS PRN
Status: DISCONTINUED | OUTPATIENT
Start: 2019-06-24 | End: 2019-06-24

## 2019-06-24 NOTE — PROGRESS NOTES
[facility] AND CLINICS  [facility] Infectious Disease  Progress Note    Diya Rosas Patient Status:  Inpatient    1950 MRN [id number]   Location [facility] 4W/SW/SE Attending Cayetano De La Cruz MD   Hosp Day # 3 PCP Burgess Calderon.  Brianna Maza MD calcifications.     Prostatomegaly.     Antibiotics Reviewed:  Vancomycin     Assessment and Plan:     1.   MRSA UTI after recent low anterior resection  - CT with some cystitis  - Urine cultures at [facility] with MRSA  - Current urine with mixed maddi only  - S t

## 2019-06-24 NOTE — DISCHARGE SUMMARY
[facility] Internal Medicine Discharge Summary   Patient ID:  Ovi Hairston  [id number]  77 year old  8/8/1950    Admit date: 6/21/2019    Discharge date and time: 6/24/2019     Attending Physician: Emily Guerra MD     Primary Care Physician: Jenn Higgins ordered renal US (not done) and repeat labs.  Pt went to [facility] and got a amezquita placed.  Taken out 2 days later and is voiding.  However dx with MRSA UTI and sent in by PCP for further tx.     Sepsis due to urinary source, MRSA UTI  - on admission WBC daily with breakfast.  Start taking on:  6/25/2019        CONTINUE taking these medications    ALPRAZolam 0.25 MG Tabs  Commonly known as:  XANAX  Take 1 tablet (0.25 mg total) by mouth daily.      diphenoxylate-atropine 2.5-0.025 MG Tabs  Commonly known as extrahepatic bile duct dilatation. SPLEEN: Normal size. PANCREAS: Limited assessment by noncontrast CT. No evidence of acute pancreatitis. ADRENALS: No mass or enlargement. KIDNEYS: No hydronephrosis. No calculi are present.   Low-density probable cyst pres calcifications. Prostatomegaly.   Dictated by (CST): Luba Alanis MD on 6/21/2019 at 18:22     Approved by (CST): Luba Alanis MD on 6/21/2019 at 18:36          Us Kidney/bladder (cpt=76770)    Result Date: 6/21/2019  PROCEDURE: US KIDNEY/BLADDER

## 2019-06-24 NOTE — WOUND PROGRESS NOTE
WOUND CARE NOTE      PLAN   Recommendations:    Ostomy:  Change flange every 5-7 days and prn  Appliance 1 3/4 2-piece moldable    Discharge Recommendations:   Pt would benefit from April Hernandez to reinforce ostomy teaching.     SUBJECTIVE   A/Ox3, awaiting d/c pl

## 2019-06-24 NOTE — PROGRESS NOTES
[facility]    Progress Note      Subjective:     Resting comfortably in bed. Reports having pain in his legs and buttocks.    Asking about discharge     Objective:   Vital Signs:  Blood pressure (!) 164/67, pulse 71, temperature 97.9 °F (36 needed for added BP control.      MRSA UTI    - antibiotics per ID      Ok for discharge from renal perspective.       Nataly Reddy MD  [address]  Nephrology

## 2019-06-24 NOTE — CM/SW NOTE
Plan is for discharge home today 6/24. Updated information and orders have been sent to [facility], via [employer]. SW notified the pt's April Hernandez of discharge plan for today.       [phone]

## 2019-06-24 NOTE — PLAN OF CARE
Problem: Patient Centered Care  Goal: Patient preferences are identified and integrated in the patient's plan of care  Description  Interventions:  - What would you like us to know as we care for you?  I had a colostomy in May of this year  - Provide time Consider collaborating with pharmacy to review patient's medication profile  - Implement strategies to promote bladder emptying  Outcome: Adequate for Discharge     Problem: METABOLIC/FLUID AND ELECTROLYTES - ADULT  Goal: Electrolytes maintained within nor

## 2019-06-24 NOTE — PROGRESS NOTES
[facility] dosing service    Follow-up Pharmacokinetic Consult for Vancomycin Dosing     Dusty Aguiar is a 76year old male who is being treated for +MRSA UTI .    Patient is on day 4 of Vancomycin and add is currently receiving 1.5 gm IV Q 24 h Extension: [phone]

## 2019-06-24 NOTE — PROGRESS NOTES
[facility]  Urology Progress Note    Anabelle Pedro Patient Status:  Inpatient    1950 MRN [id number]   Location [facility] 4W/SW/SE Attending Ashraf MD Jaja   Hosp Day # 3 PCP Cindy Hobbs.  Halley Pascual MD     Subjective:  Vyt

## 2019-06-24 NOTE — PAYOR COMM NOTE
--------------  ADMISSION REVIEW     Payor: [address] #:  [id number]  Authorization Number: [id number]    Admit date: 6/21/19  Admit time: 4637       Patient Seen in: [facility] 4w/sw/se    History   Patient presents with:  U polyps- repeat colon 1 yr   • COLONOSCOPY N/A 8/7/2018    Performed by Carole Berger MD at [facility]. (EUS) N/A 9/21/2018    Performed by Carole Berger MD at [facility] ENDOSCOPY   • ESOPHAGOGASTRODUODENOSCOPY (EGD) N/A 8/7/ affect. His behavior is normal.   Nursing note and vitals reviewed.        ED Course     Labs Reviewed   URINALYSIS WITH CULTURE REFLEX - Abnormal; Notable for the following components:       Result Value    Clarity Urine Cloudy (*)     Blood Urine Small Nichelle Pablito cx pending  -ID consult  -did have amezquita post op    ABBY  -baseline around 1 and up to 2.16  -cr 1.87 on admit, renal consult    Rectal cancer   - s/p LAR with diverting loop ileostomy late 5/2019 with Dr Yeimi Mariano   - ostomy care  - follow up with onc as o (hypertension)    • Hyperlipidemia    • Neuropathy    • PAD (peripheral artery disease) (HCC)    • Tobacco abuse         PSH  Past Surgical History:   Procedure Laterality Date   • COLONOSCOPY  01/2019    GSAM- rectal mass; polyps- repeat colon 1 yr   • [place], PAD with neuropathy, carotid stenosis b/l, GERD, CAD s/p prior MI, HTN, HL, and colorectal cancer s/p LAR with diverting loop ileostomy who has about 2 weeks of feeling unwell and jittery/anxious.  At that time also found to have [facility]         Last 3 Weights  06/22/19 0739 : 218 lb 8 oz (99.1 kg)  06/21/19 1231 : 211 lb 1.6 oz (95.8 kg)  06/21/19 1002 : 191 lb 12.8 oz (87 kg)  06/13/19 1114 : 192 lb (87.1 kg)  05/23/19 0920 : 208 lb (94.3 kg)        Exam  Gen: No acute distress, alert low-density presumed fluid collection extending cranially in the presacral space. This could represent postsurgical sequela such as seroma or liquified hematoma. Super infection/abscess, and or anastomotic leak not excluded.    Perivesicular stranding whi able  -cont BB/statin     HL, HTN  -cont BB     Smoker  -declined nicotine patch, 1/3 ppd     FEN  -IVF, lytes in AM, Cardiac diet     PPX; HSQ     Dispo pending course, full code      Subjective      Overall feels better, still feels pressure in rectum pr HGB 12.9 12.2* 10.8*   MCV  --  97.8 99.1    241.0 238. 0         Lab 06/18/19 06/21/19  1030 06/22/19  0841 06/22/19  1037 06/23/19  0507   * 136 141  --  142   K 5.0 4.6  --  4.2 4.1   CL 96* 103 111  --  111   CO2 22.0 23.0 24.0  --  26.0 sodium chloride 0.9% 500 mL IVPB     Date Action Dose Route User    6/23/2019 1217 New Bag 1500 mg Intravenous Chantell Knight RN          REVIEWER COMMENTS:     OTHER:

## 2019-06-24 NOTE — PAYOR COMM NOTE
--------------  CONTINUED STAY REVIEW-----REQUESTING ADDITIONAL DAY 6/24      Payor: [address] [place] [address] #:  [id number]  Authorization Number: [id number]    Admit date: 6/21/19  Admit time: 1216    Admitting Physician: MD MIAN Devine MRSA in urine as an outpatient  Repeat urine negative     Radiology:     CT:     Post low anterior resection changes and right abdominal loop ileostomy.  No bowel obstruction.  Along the posterior margin of the colo-rectal anastomosis there is a low-densit 6/24/2019 0920 Given 100 mg Oral Nola JAMES Kate    6/23/2019 2134 Given 100 mg Oral Effie Galeana RN      Heparin Sodium (Porcine) 5000 UNIT/ML injection 5,000 Units     Date Action Dose Route User    6/24/2019 1320 Given 5000 Units [place] [employer]

## 2019-06-25 NOTE — PAYOR COMM NOTE
--------------  DISCHARGE REVIEW    Payor: [address] [place] [address] #:  [id number]  Authorization Number: [id number]    Admit date: 6/21/19  Admit time:  8281  Discharge Date: 6/24/2019  5:04 PM     Admitting Physician: MD Jose Rafael Rivera the hospital in early June, he has not felt right. Felt a lot of pressure, frequent urge to go, dysuria. No fevers but maybe had chills. No n/v. Now that he is the hospital he does feel better. Here Cr is 1.8 and repeat Ucx pending.   Prior hospitaliza nicotine patch, 1/3 ppd     FEN  -IVF, lytes in AM, Cardiac diet     PPX; HSQ     Dispo home, lives by self, [facility]; full code    Day of discharge Exam    06/24/19  1329   BP: 148/74   Pulse: 77   Resp: 18   Temp: 98 °F (36.7 °C)       Exam on day of discharge (CPT=74176)  COMPARISON:   None. INDICATIONS:   Leukocytosis, dysuria, post lower anterior resection with diverting loop ileostomy in May 2019.   TECHNIQUE: CT images of the abdomen and pelvis were obtained without intravenous contrast material.  Automated bone island in the ventral aspect of the L4 vertebral body. LUNG BASES: Atelectasis present at the visualized lung bases. Coronary artery calcifications. OTHER: Bilateral fat containing inguinal hernias.          CONCLUSION:   Post low anterior resection c Full Code    Total Time Coordinating Care: Greater than 30 minutes    Patient had opportunity to ask questions and state understand and agree with therapeutic plan as outlined.  Reviewed chart including previous progress notes and d/w RN Twin Barriga

## 2019-07-10 PROBLEM — F41.9 ANXIETY: Status: ACTIVE | Noted: 2019-07-10

## 2019-07-18 PROCEDURE — 81015 MICROSCOPIC EXAM OF URINE: CPT | Performed by: UROLOGY

## 2019-10-23 PROBLEM — R10.2 PELVIC PAIN: Status: ACTIVE | Noted: 2019-10-23

## 2019-10-23 PROBLEM — M62.89 PELVIC FLOOR DYSFUNCTION: Status: ACTIVE | Noted: 2019-10-23

## 2019-10-25 PROBLEM — Z85.038 HISTORY OF COLON CANCER: Status: ACTIVE | Noted: 2019-10-25

## 2019-11-11 PROBLEM — D01.1: Status: RESOLVED | Noted: 2019-11-11 | Resolved: 2019-11-11

## 2019-11-11 PROBLEM — N18.30 CKD (CHRONIC KIDNEY DISEASE) STAGE 3, GFR 30-59 ML/MIN (HCC): Status: ACTIVE | Noted: 2019-11-11

## 2019-11-11 PROBLEM — D01.1: Status: ACTIVE | Noted: 2019-11-11

## 2020-05-26 PROBLEM — Z93.2 ILEOSTOMY PRESENT (HCC): Status: RESOLVED | Noted: 2019-06-13 | Resolved: 2020-05-26

## 2020-05-26 PROBLEM — Z85.048 HISTORY OF RECTAL CANCER: Status: ACTIVE | Noted: 2020-05-26

## 2020-05-26 PROBLEM — E27.8 ADRENAL NODULE (HCC): Status: ACTIVE | Noted: 2020-05-26

## 2020-05-26 PROBLEM — C19 CARCINOMA OF RECTOSIGMOID (COLON) (HCC): Status: RESOLVED | Noted: 2018-10-19 | Resolved: 2020-05-26

## 2020-05-26 PROBLEM — E27.9 ADRENAL NODULE (HCC): Status: ACTIVE | Noted: 2020-05-26

## 2020-05-26 PROBLEM — I77.9 BILATERAL CAROTID ARTERY DISEASE (HCC): Status: ACTIVE | Noted: 2020-05-26

## 2020-05-26 PROBLEM — I77.9 BILATERAL CAROTID ARTERY DISEASE: Status: ACTIVE | Noted: 2020-05-26

## 2020-05-27 PROBLEM — I73.9 CLAUDICATION OF BOTH LOWER EXTREMITIES: Status: ACTIVE | Noted: 2020-05-27

## 2020-05-27 PROBLEM — I73.9 CLAUDICATION OF BOTH LOWER EXTREMITIES (HCC): Status: ACTIVE | Noted: 2020-05-27

## 2020-07-28 PROBLEM — M54.42 CHRONIC BILATERAL LOW BACK PAIN WITH BILATERAL SCIATICA: Status: ACTIVE | Noted: 2020-07-28

## 2020-07-28 PROBLEM — M54.41 CHRONIC BILATERAL LOW BACK PAIN WITH BILATERAL SCIATICA: Status: ACTIVE | Noted: 2020-07-28

## 2020-07-28 PROBLEM — M43.10 SPONDYLOLISTHESIS, GRADE 1: Status: ACTIVE | Noted: 2020-07-28

## 2020-07-28 PROBLEM — G89.29 CHRONIC BILATERAL LOW BACK PAIN WITH BILATERAL SCIATICA: Status: ACTIVE | Noted: 2020-07-28

## 2020-10-16 PROBLEM — M79.661 PAIN IN RIGHT LOWER LEG: Status: ACTIVE | Noted: 2020-10-16

## 2020-10-20 ENCOUNTER — APPOINTMENT (OUTPATIENT)
Dept: GENERAL RADIOLOGY | Facility: HOSPITAL | Age: 70
DRG: 271 | End: 2020-10-20
Attending: EMERGENCY MEDICINE
Payer: MEDICARE

## 2020-10-20 ENCOUNTER — HOSPITAL ENCOUNTER (INPATIENT)
Facility: HOSPITAL | Age: 70
LOS: 4 days | Discharge: HOME | DRG: 271 | End: 2020-10-24
Attending: EMERGENCY MEDICINE | Admitting: HOSPITALIST
Payer: MEDICARE

## 2020-10-20 ENCOUNTER — APPOINTMENT (OUTPATIENT)
Dept: CT IMAGING | Facility: HOSPITAL | Age: 70
DRG: 271 | End: 2020-10-20
Attending: EMERGENCY MEDICINE
Payer: MEDICARE

## 2020-10-20 DIAGNOSIS — L03.115 CELLULITIS OF RIGHT LOWER EXTREMITY: ICD-10-CM

## 2020-10-20 DIAGNOSIS — I99.8 ISCHEMIC TOE: Primary | ICD-10-CM

## 2020-10-20 PROBLEM — R73.9 HYPERGLYCEMIA: Status: ACTIVE | Noted: 2020-10-20

## 2020-10-20 PROCEDURE — 99291 CRITICAL CARE FIRST HOUR: CPT

## 2020-10-20 PROCEDURE — 93010 ELECTROCARDIOGRAM REPORT: CPT | Performed by: EMERGENCY MEDICINE

## 2020-10-20 PROCEDURE — 96365 THER/PROPH/DIAG IV INF INIT: CPT

## 2020-10-20 PROCEDURE — 80048 BASIC METABOLIC PNL TOTAL CA: CPT | Performed by: EMERGENCY MEDICINE

## 2020-10-20 PROCEDURE — 96366 THER/PROPH/DIAG IV INF ADDON: CPT

## 2020-10-20 PROCEDURE — 85730 THROMBOPLASTIN TIME PARTIAL: CPT | Performed by: EMERGENCY MEDICINE

## 2020-10-20 PROCEDURE — 85730 THROMBOPLASTIN TIME PARTIAL: CPT | Performed by: HOSPITALIST

## 2020-10-20 PROCEDURE — 85025 COMPLETE CBC W/AUTO DIFF WBC: CPT | Performed by: EMERGENCY MEDICINE

## 2020-10-20 PROCEDURE — 75635 CT ANGIO ABDOMINAL ARTERIES: CPT | Performed by: EMERGENCY MEDICINE

## 2020-10-20 PROCEDURE — 73630 X-RAY EXAM OF FOOT: CPT | Performed by: EMERGENCY MEDICINE

## 2020-10-20 PROCEDURE — 85610 PROTHROMBIN TIME: CPT | Performed by: EMERGENCY MEDICINE

## 2020-10-20 PROCEDURE — 93005 ELECTROCARDIOGRAM TRACING: CPT

## 2020-10-20 PROCEDURE — 81003 URINALYSIS AUTO W/O SCOPE: CPT | Performed by: EMERGENCY MEDICINE

## 2020-10-20 RX ORDER — METOPROLOL SUCCINATE 25 MG/1
25 TABLET, EXTENDED RELEASE ORAL DAILY
Status: DISCONTINUED | OUTPATIENT
Start: 2020-10-21 | End: 2020-10-24

## 2020-10-20 RX ORDER — BIOTIN 1 MG
1 TABLET ORAL DAILY
COMMUNITY
End: 2021-08-04

## 2020-10-20 RX ORDER — HYDROCODONE BITARTRATE AND ACETAMINOPHEN 5; 325 MG/1; MG/1
1 TABLET ORAL EVERY 6 HOURS PRN
Status: ON HOLD | COMMUNITY
End: 2020-10-24

## 2020-10-20 RX ORDER — ATORVASTATIN CALCIUM 40 MG/1
40 TABLET, FILM COATED ORAL NIGHTLY
Refills: 3 | Status: DISCONTINUED | OUTPATIENT
Start: 2020-10-20 | End: 2020-10-24

## 2020-10-20 RX ORDER — TAMSULOSIN HYDROCHLORIDE 0.4 MG/1
0.4 CAPSULE ORAL DAILY
Refills: 0 | Status: DISCONTINUED | OUTPATIENT
Start: 2020-10-21 | End: 2020-10-24

## 2020-10-20 RX ORDER — HEPARIN SODIUM 10000 [USP'U]/100ML
INJECTION, SOLUTION INTRAVENOUS
Status: COMPLETED
Start: 2020-10-20 | End: 2020-10-20

## 2020-10-20 RX ORDER — HEPARIN SODIUM AND DEXTROSE 10000; 5 [USP'U]/100ML; G/100ML
INJECTION INTRAVENOUS CONTINUOUS
Status: DISCONTINUED | OUTPATIENT
Start: 2020-10-20 | End: 2020-10-23 | Stop reason: ALTCHOICE

## 2020-10-20 RX ORDER — HEPARIN SODIUM 1000 [USP'U]/ML
INJECTION, SOLUTION INTRAVENOUS; SUBCUTANEOUS
Status: COMPLETED
Start: 2020-10-20 | End: 2020-10-20

## 2020-10-20 RX ORDER — HEPARIN SODIUM AND DEXTROSE 10000; 5 [USP'U]/100ML; G/100ML
1000 INJECTION INTRAVENOUS ONCE
Status: COMPLETED | OUTPATIENT
Start: 2020-10-20 | End: 2020-10-20

## 2020-10-20 RX ORDER — HYDROCODONE BITARTRATE AND ACETAMINOPHEN 10; 325 MG/1; MG/1
1 TABLET ORAL EVERY 4 HOURS PRN
Status: DISCONTINUED | OUTPATIENT
Start: 2020-10-20 | End: 2020-10-24

## 2020-10-20 RX ORDER — ALPRAZOLAM 0.25 MG/1
0.25 TABLET ORAL
Status: DISCONTINUED | OUTPATIENT
Start: 2020-10-20 | End: 2020-10-24

## 2020-10-20 RX ORDER — VANCOMYCIN 2 GRAM/500 ML IN 0.9 % SODIUM CHLORIDE INTRAVENOUS
25 ONCE
Status: COMPLETED | OUTPATIENT
Start: 2020-10-20 | End: 2020-10-20

## 2020-10-20 RX ORDER — MORPHINE SULFATE 2 MG/ML
2 INJECTION, SOLUTION INTRAMUSCULAR; INTRAVENOUS EVERY 2 HOUR PRN
Status: DISCONTINUED | OUTPATIENT
Start: 2020-10-20 | End: 2020-10-24

## 2020-10-20 RX ORDER — ASPIRIN 81 MG/1
324 TABLET, CHEWABLE ORAL ONCE
Status: COMPLETED | OUTPATIENT
Start: 2020-10-20 | End: 2020-10-20

## 2020-10-20 RX ORDER — HEPARIN SODIUM 1000 [USP'U]/ML
5000 INJECTION, SOLUTION INTRAVENOUS; SUBCUTANEOUS ONCE
Status: COMPLETED | OUTPATIENT
Start: 2020-10-20 | End: 2020-10-20

## 2020-10-20 NOTE — ED NOTES
Orders for admission, patient is aware of plan and ready to go upstairs. Any questions, please call ED RN sandra at extension [phone].    Type of COVID test sent:rapid  COVID Suspicion level: Low    Titratable drug(s) infusing:heparin  Rate:10ml/hr    LOC at ti

## 2020-10-20 NOTE — H&P
[facility] Hospitalist H&P       CC: Patient presents with:  Pain       PCP: Tata Hill MD    History of Present Illness: 78 y/o m w hx rectal ca c/w adenocarcinoma, pad s/p stent to right common iliac 2008, previous u/s w mod left sup fem a stenosis, Encounter).         Soc Hx  Social History    Tobacco Use      Smoking status: Current Every Day Smoker        Packs/day: 0.33        Years: 50.00        Pack years: 16.5        Types: Cigarettes      Smokeless tobacco: Never Used    Alcohol use: Not Pattricia Salts ALT, AST, ALB, AMYLASE, LIPASE, LDH in the last 168 hours. Invalid input(s): ALPHOS, TBIL, DBIL, TPROT    No results for input(s): TROP in the last 168 hours.         Radiology: Xr Foot, Complete (min 3 Views), Right (cpt=73630)    Result Date: 10/20/202

## 2020-10-20 NOTE — ED INITIAL ASSESSMENT (HPI)
Pt to ED with c/o right lower leg and foot pain for a few weeks. Pt denies trauma or fall. Pt sent by Podiatrist. Pt had u/s done 10- to right leg. +PAD and negative for DVT. Pt right 5th toe discolored and with wound noted.  +weak Doppler of right p

## 2020-10-20 NOTE — ED NOTES
Patient presents to ER with c/o pain to RLE. States the pain has been worse over the last 2 days. Patient describes pain at [facility] shooting down my leg. \" Denies injury or trauma. Wound/blister to right 5th toe. No drainage noted. Denies fever.

## 2020-10-20 NOTE — PROGRESS NOTES
[facility] Dosing Service    Initial Pharmacokinetic Consult for Vancomycin Dosing     Davy Patterson is a 79year old patient who is being treated for osteomyelitis.   Pharmacy has been asked to dose Vancomycin by Dr. Jessica Pendleton    Patient has no kno

## 2020-10-20 NOTE — ED PROVIDER NOTES
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 Capillary fill is brisk. Calf is nontender without cords. Skin:     General: Skin is warm and dry. Findings: No rash.    Neurological:      Mental Status: He is alert and o on 10/20/2020 at 3:12 PM     Finalized by (CST): Esau Freedman MD on 10/20/2020 at 3:13 PM            Radiology exams  Viewed and reviewed by myself and findings discussed with patient including need for follow up    Admission disposition: 10/20/2020

## 2020-10-21 ENCOUNTER — APPOINTMENT (OUTPATIENT)
Dept: MRI IMAGING | Facility: HOSPITAL | Age: 70
DRG: 271 | End: 2020-10-21
Attending: HOSPITALIST
Payer: MEDICARE

## 2020-10-21 PROCEDURE — 85730 THROMBOPLASTIN TIME PARTIAL: CPT | Performed by: SURGERY

## 2020-10-21 PROCEDURE — 80053 COMPREHEN METABOLIC PANEL: CPT | Performed by: HOSPITALIST

## 2020-10-21 PROCEDURE — 85730 THROMBOPLASTIN TIME PARTIAL: CPT | Performed by: HOSPITALIST

## 2020-10-21 PROCEDURE — 85027 COMPLETE CBC AUTOMATED: CPT | Performed by: HOSPITALIST

## 2020-10-21 PROCEDURE — 73718 MRI LOWER EXTREMITY W/O DYE: CPT | Performed by: HOSPITALIST

## 2020-10-21 RX ORDER — TRAMADOL HYDROCHLORIDE 50 MG/1
50 TABLET ORAL EVERY 6 HOURS PRN
Status: DISCONTINUED | OUTPATIENT
Start: 2020-10-21 | End: 2020-10-24

## 2020-10-21 RX ORDER — ASPIRIN 325 MG
325 TABLET ORAL DAILY
Status: DISCONTINUED | OUTPATIENT
Start: 2020-10-21 | End: 2020-10-24

## 2020-10-21 NOTE — PLAN OF CARE
VS stable. C/o R foot pain, declined pain medication at this time. MRI of R foot done. R foot - open to air. Plan for procedure on Friday with Dr Jacob Engle. Pt on IV Vanco and IV Zosyn. Pedal pulses by doppler. Fall prec in place. Steady gait on ambulation.  Neo Guerrero effects  - Notify MD/LIP if interventions unsuccessful or patient reports new pain  - Anticipate increased pain with activity and pre-medicate as appropriate  Outcome: Progressing     Problem: SKIN/TISSUE INTEGRITY - ADULT  Goal: Skin integrity remains int

## 2020-10-21 NOTE — PROGRESS NOTES
Patient received as an admission. VS stable. NSR on tele. Heparin gtt infusing per order. PTT ordered for 2130. Vancomycin infusing per order. Patient oriented to room and unit. Call light within reach.

## 2020-10-21 NOTE — PROGRESS NOTES
[facility] Hospitalist Progress Note     CC: Hospital Follow up    PCP: Gina Hopper MD       Assessment/Plan:     Principal Problem:    Ischemic toe  Active Problems:    Hyperglycemia    Cellulitis of right lower extremity    80 y/o m w hx rectal ca c/w a murmurs, 2+ peripheral pulses  ABD: Soft, non-tender, non-distended, +BS  MSK: strength 5/5 in all extremities  Neuro: Grossly normal, CN intact, sensory intact  Psych: Affect- normal  SKIN: warm, dry  EXT: no edema      Data Review:       Labs:     Recent thickening, unchanged. Bladder wall thickening likely secondary from an enlarged prostate. Multiple other incidental findings as described in the body of the report which are unchanged.      Dictated by (CST): Cristine Wright MD on 10/20/2020 at 5:33 PM

## 2020-10-21 NOTE — CONSULTS
Jorge Hastings, [facility]  Vascular and Endovascular Surgery  [address]     VASCULAR SURGERY   INPATIENT CONSULT NOTE      Name: Bernard Brown   :   1950  [id number]     Date of Consultation: 10/21/2020       DEVORAH Patient is a 79year old male whom I have been asked to see regarding new development of right lower extremity ischemia. I had last seen the patient back in June for a routine visit to establish care with a vascular surgeon.   The patient is a smoker and h • [facility]    • GERD (gastroesophageal reflux disease)    • Heart attack ([facility].)    • High blood pressure    • High cholesterol    • Neuropathy    • Osteoarthritis    • PAD (peripheral artery disease) (HCC)    • Tobacco abuse        PAST SURGICAL H Patient  reports that he has been smoking cigarettes. He has a 16.50 pack-year smoking history. He has never used smokeless tobacco. He reports previous alcohol use. He reports that he does not use drugs.     FAMILY HISTORY:    Patient's family history incl PTP 13.1  --   --    INR 1.01  --   --    PTT 29.2 41.3* 44.5*     No results for input(s): ALT, AST, ALB, AMYLASE, LIPASE, LDH in the last 168 hours. Invalid input(s): ALPHOS, TBIL, DBIL, TPROT  No results for input(s): TROP in the last 168 hours.   No Xr Foot, Complete (min 3 Views), Right (cpt=73630)    Result Date: 10/12/2020  DATE OF SERVICE: 10.12.2020 RIGHT FOOT HISTORY: Pain FINDINGS: Three views of the right foot are submitted for evaluation. No fracture or dislocation is identified.  There is mod ---------------------------------------------------------------------------- Lower Extremity Venous Duplex Patient: Goldy Olguin Date: Oct 16 2020 10:13AM :     1950 (70yrs)             Accession#: [id number] MRN:     [id number] !Right profunda femoral   !Patent ! None      ! Normal phasicity;            ! !                         !       !          !spontaneous; normal          ! !                         !       !          !augmentation                 !  +------------------------ +-------------------------+-------+----------+-----------------------------+ ! Right soleal             !Patent ! None      ! Compressible                 ! +-------------------------+-------+----------+-----------------------------+ ! Right great saphenous femoral !Patent ! None      ! Compressible                 ! !distal                   !       !          !                             ! +-------------------------+-------+----------+-----------------------------+ ! Left popliteal           !Patent ! None gray scale imaging. Location:  Vascular laboratory. Patient status:  Outpatient. Procedure:  A vascular evaluation was performed with the patient in the supine position.  The right common femoral, right superficial femoral, right profunda femoral, right p PROCEDURE: CTA ABD PEL MIKE LEG RUNOFF DIAPH TO TOES IV CONTRAST (PJA=89195)  COMPARISON: [facility], [place] ABDOMEN + PELVIS (CPT=74176), 6/21/2019, 6:08 PM.  INDICATIONS: Right lower extremity ischemia and cellulitis of right foot.   TECHNIQUE: stenosis and narrowing involving the proximal superficial femoral artery extending into the mid and distal aspects of the SFA.   There is an area of complete occlusion seen along the mid superficial femoral artery (series 15, image 220 and series 9, image 4 the distal aspect of the adductor canal.. No complete occlusion. The profundus femoris artery is patent.   POPLITEAL ARTERY:   Moderate grade atherosclerotic calcification and noncalcified atheromatous plaque seen throughout the origin of the popliteal art to chronic bladder outlet obstruction from an enlarged prostate. There is presacral soft tissue thickening. BONES:   There is degenerative disease of the thoracic and lumbar spine.  Degenerative changes are seen within the sacroiliac joints and pubic symph The patient is a 79year old male who has a history of peripheral arterial disease and now appears to have developed focal occlusion of his right superficial femoral artery as well as his posterior tibial artery.   His foot is viable and warm with no motor

## 2020-10-21 NOTE — PAYOR COMM NOTE
--------------  ADMISSION REVIEW     Payor: [address] #:  [id number]  Authorization Number: [id number]    Admit date: 10/20/20  Admit time: 9299     Patient Seen in: [facility] Emergency Department      History   Stated Compl • ESOPHAGOGASTRODUODENOSCOPY (EGD) N/A 8/7/2018    Performed by Bebe Patel MD at [facility] N/A 9/21/2018    Performed by Bebe Patel MD at [facility] ENDOSCOPY   [facility] and [facility]. Findings: No rash.      Labs Reviewed   BASIC METABOLIC PANEL (8) - Abnormal; Notable for the following components:       Result Value    Glucose 113 (*)     BUN 19 (*)     All other components within normal limits   CBC W/ DIFFER Ht 6' (1.829 m)   Wt 200 lb (90.7 kg)   SpO2 98%   BMI 27.12 kg/m²   General:  Alert, no distress   Head:  Normocephalic, without obvious abnormality, atraumatic. Eyes:  Sclera anicteric, No conjunctival pallor, EOMs intact.     Nose: Nares normal. Septum Problem:    Ischemic toe  Active Problems:    Hyperglycemia    Cellulitis of right lower extremity     78 y/o m w hx rectal ca c/w adenocarcinoma, pad s/p stent to right common iliac 2008, previous u/s w mod left sup fem a stenosis,  of right foot pain sin secondary from an enlarged prostate.   Multiple other incidental findings as described in the body of the report which are unchanged.      Meds:      • vancomycin  1,500 mg Intravenous Q12H   • piperacillin-tazobactam  3.375 g Intravenous Q8H   • tamsulosin 10/20/2020 1617 Given 90 mL Intravenous (Right Antecubital) Raeann Luciano      Metoprolol Succinate ER (Toprol XL) 24 hr tab 25 mg     Date Action Dose Route User    10/21/2020 1055 Given 25 mg Oral Nelida Arvizu RN      Piperacillin Sod-Tazobactam So (ZOS

## 2020-10-22 PROCEDURE — 85730 THROMBOPLASTIN TIME PARTIAL: CPT | Performed by: SURGERY

## 2020-10-22 PROCEDURE — 85027 COMPLETE CBC AUTOMATED: CPT | Performed by: HOSPITALIST

## 2020-10-22 PROCEDURE — 80202 ASSAY OF VANCOMYCIN: CPT | Performed by: HOSPITALIST

## 2020-10-22 PROCEDURE — 80053 COMPREHEN METABOLIC PANEL: CPT | Performed by: HOSPITALIST

## 2020-10-22 RX ORDER — LOPERAMIDE HYDROCHLORIDE 2 MG/1
2 CAPSULE ORAL 4 TIMES DAILY PRN
Status: DISCONTINUED | OUTPATIENT
Start: 2020-10-22 | End: 2020-10-23

## 2020-10-22 RX ORDER — VANCOMYCIN HYDROCHLORIDE
1250 EVERY 12 HOURS
Status: DISCONTINUED | OUTPATIENT
Start: 2020-10-22 | End: 2020-10-22

## 2020-10-22 NOTE — PLAN OF CARE
VSS on RA. Heparin drip infusing, not at 1450. Pedal pulses-doppler. R foot pain-denied medication.  Plan for angiogram friday    Problem: Patient Centered Care  Goal: Patient preferences are identified and integrated in the patient's plan of care  Descript appropriate  Outcome: Progressing     Problem: SKIN/TISSUE INTEGRITY - ADULT  Goal: Skin integrity remains intact  Description: INTERVENTIONS  - Assess and document risk factors for pressure ulcer development  - Assess and document skin integrity  - Monito

## 2020-10-22 NOTE — PROGRESS NOTES
[facility] dosing service    Follow-up Pharmacokinetic Consult for Vancomycin Dosing     Flavio Guillaume is a 79year old patient who is being treated for cellulitis.    Patient is on day 3 of Vancomycin and is currently receiving 1.5 gm IV Q 12 ho

## 2020-10-22 NOTE — PAYOR COMM NOTE
--------------  10/22 CONTINUED STAY REVIEW    Payor: Karlee  Subscriber #:  [id number]  Authorization Number: [id number]    HOSPITALIST:     Assessment/Plan:      Principal Problem:    Ischemic toe  Active Problems:    Hyperglycemia    Cell 1,500 mg Intravenous Q12H   • piperacillin-tazobactam  3.375 g Intravenous Q8H   • tamsulosin HCl  0.4 mg Oral Daily   • Metoprolol Succinate ER  25 mg Oral Daily   • atorvastatin  40 mg Oral Nightly      • Continuous dose Heparin infusion 1,450 Units/hr (

## 2020-10-22 NOTE — PLAN OF CARE
Patient refusing pain medications; states both Norco and Tramadol make it seem like he is falling. Patient very sleepy; resting in bed; call light within reach; will continue to monitor.         Problem: Patient Centered Care  Goal: Patient preferences are Implement non-pharmacological measures as appropriate and evaluate response  - Consider cultural and social influences on pain and pain management  - Manage/alleviate anxiety  - Utilize distraction and/or relaxation techniques  - Monitor for opioid side ef Shasta Mangle  Outcome: Progressing

## 2020-10-22 NOTE — PROGRESS NOTES
[facility] Hospitalist Progress Note     CC: Hospital Follow up    PCP: Ese Navarro MD       Assessment/Plan:     Principal Problem:    Ischemic toe  Active Problems:    Hyperglycemia    Cellulitis of right lower extremity    78 y/o m w hx rectal ca c/w a no crackles or wheezes  CV: RRR, no murmurs, 2+ peripheral pulses  ABD: Soft, non-tender, non-distended, +BS  MSK: strength 5/5 in all extremities  Neuro: Grossly normal, CN intact, sensory intact  Psych: Affect- normal  SKIN: warm, dry  EXT: no edema is patent. Multifocal areas of high-grade stenosis within the left superficial femoral artery without focal vascular cut off or complete occlusion. Postoperative changes of prior distal sigmoid resection with presacral soft tissue thickening, unchanged.

## 2020-10-23 ENCOUNTER — APPOINTMENT (OUTPATIENT)
Dept: INTERVENTIONAL RADIOLOGY/VASCULAR | Facility: HOSPITAL | Age: 70
DRG: 271 | End: 2020-10-23
Attending: SURGERY
Payer: MEDICARE

## 2020-10-23 PROCEDURE — 85347 COAGULATION TIME ACTIVATED: CPT

## 2020-10-23 PROCEDURE — 37226 HC TRANSLUM ANGIOPLASTY W STENT FEM POP UNILAT: CPT

## 2020-10-23 PROCEDURE — 04CR3ZZ EXTIRPATION OF MATTER FROM RIGHT POSTERIOR TIBIAL ARTERY, PERCUTANEOUS APPROACH: ICD-10-PCS | Performed by: SURGERY

## 2020-10-23 PROCEDURE — 37228 HC TRANSLUMINAL ANGIO TIBIAL PERONEAL UNILAT INIT: CPT

## 2020-10-23 PROCEDURE — 99152 MOD SED SAME PHYS/QHP 5/>YRS: CPT

## 2020-10-23 PROCEDURE — 85730 THROMBOPLASTIN TIME PARTIAL: CPT | Performed by: SURGERY

## 2020-10-23 PROCEDURE — 37184 PRIM ART M-THRMBC 1ST VSL: CPT

## 2020-10-23 PROCEDURE — 37232 HC TRANSL ANGIOPLASTY TIBIAL PERONEAL UNIL EA ADDL: CPT

## 2020-10-23 PROCEDURE — 75710 ARTERY X-RAYS ARM/LEG: CPT

## 2020-10-23 PROCEDURE — 047R3ZZ DILATION OF RIGHT POSTERIOR TIBIAL ARTERY, PERCUTANEOUS APPROACH: ICD-10-PCS | Performed by: SURGERY

## 2020-10-23 PROCEDURE — 04CP3ZZ EXTIRPATION OF MATTER FROM RIGHT ANTERIOR TIBIAL ARTERY, PERCUTANEOUS APPROACH: ICD-10-PCS | Performed by: SURGERY

## 2020-10-23 PROCEDURE — X27H385 DILATION OF RIGHT FEMORAL ARTERY WITH SUSTAINED RELEASE DRUG-ELUTING INTRALUMINAL DEVICE, PERCUTANEOUS APPROACH, NEW TECHNOLOGY GROUP 5: ICD-10-PCS | Performed by: SURGERY

## 2020-10-23 PROCEDURE — 85027 COMPLETE CBC AUTOMATED: CPT | Performed by: HOSPITALIST

## 2020-10-23 PROCEDURE — 80053 COMPREHEN METABOLIC PANEL: CPT | Performed by: HOSPITALIST

## 2020-10-23 PROCEDURE — 99153 MOD SED SAME PHYS/QHP EA: CPT

## 2020-10-23 PROCEDURE — 047T3ZZ DILATION OF RIGHT PERONEAL ARTERY, PERCUTANEOUS APPROACH: ICD-10-PCS | Performed by: SURGERY

## 2020-10-23 PROCEDURE — 047P3ZZ DILATION OF RIGHT ANTERIOR TIBIAL ARTERY, PERCUTANEOUS APPROACH: ICD-10-PCS | Performed by: SURGERY

## 2020-10-23 RX ORDER — MIDAZOLAM HYDROCHLORIDE 1 MG/ML
INJECTION INTRAMUSCULAR; INTRAVENOUS
Status: COMPLETED
Start: 2020-10-23 | End: 2020-10-23

## 2020-10-23 RX ORDER — CLOPIDOGREL BISULFATE 75 MG/1
TABLET ORAL
Status: COMPLETED
Start: 2020-10-23 | End: 2020-10-23

## 2020-10-23 RX ORDER — ACETAMINOPHEN 325 MG/1
650 TABLET ORAL EVERY 4 HOURS PRN
Status: DISCONTINUED | OUTPATIENT
Start: 2020-10-23 | End: 2020-10-24

## 2020-10-23 RX ORDER — HYDROCODONE BITARTRATE AND ACETAMINOPHEN 5; 325 MG/1; MG/1
1 TABLET ORAL EVERY 4 HOURS PRN
Status: DISCONTINUED | OUTPATIENT
Start: 2020-10-23 | End: 2020-10-24

## 2020-10-23 RX ORDER — LIDOCAINE HYDROCHLORIDE 20 MG/ML
INJECTION, SOLUTION EPIDURAL; INFILTRATION; INTRACAUDAL; PERINEURAL
Status: COMPLETED
Start: 2020-10-23 | End: 2020-10-23

## 2020-10-23 RX ORDER — SODIUM CHLORIDE 0.9 % (FLUSH) 0.9 %
10 SYRINGE (ML) INJECTION AS NEEDED
Status: DISCONTINUED | OUTPATIENT
Start: 2020-10-23 | End: 2020-10-24

## 2020-10-23 RX ORDER — CLOPIDOGREL BISULFATE 75 MG/1
300 TABLET ORAL ONCE
Status: DISCONTINUED | OUTPATIENT
Start: 2020-10-23 | End: 2020-10-23 | Stop reason: ALTCHOICE

## 2020-10-23 RX ORDER — DIPHENOXYLATE HYDROCHLORIDE AND ATROPINE SULFATE 2.5; .025 MG/1; MG/1
1 TABLET ORAL 4 TIMES DAILY PRN
Status: DISCONTINUED | OUTPATIENT
Start: 2020-10-23 | End: 2020-10-24

## 2020-10-23 RX ORDER — PROTAMINE SULFATE 10 MG/ML
INJECTION, SOLUTION INTRAVENOUS
Status: DISCONTINUED
Start: 2020-10-23 | End: 2020-10-23 | Stop reason: WASHOUT

## 2020-10-23 RX ORDER — CLOPIDOGREL BISULFATE 75 MG/1
75 TABLET ORAL DAILY
Status: DISCONTINUED | OUTPATIENT
Start: 2020-10-24 | End: 2020-10-24

## 2020-10-23 RX ORDER — HYDROCODONE BITARTRATE AND ACETAMINOPHEN 5; 325 MG/1; MG/1
2 TABLET ORAL EVERY 4 HOURS PRN
Status: DISCONTINUED | OUTPATIENT
Start: 2020-10-23 | End: 2020-10-24

## 2020-10-23 RX ORDER — HEPARIN SODIUM 1000 [USP'U]/ML
INJECTION, SOLUTION INTRAVENOUS; SUBCUTANEOUS
Status: COMPLETED
Start: 2020-10-23 | End: 2020-10-23

## 2020-10-23 RX ORDER — SODIUM CHLORIDE 9 MG/ML
INJECTION, SOLUTION INTRAVENOUS CONTINUOUS
Status: DISCONTINUED | OUTPATIENT
Start: 2020-10-23 | End: 2020-10-24

## 2020-10-23 RX ORDER — NITROGLYCERIN 20 MG/100ML
INJECTION INTRAVENOUS
Status: COMPLETED
Start: 2020-10-23 | End: 2020-10-23

## 2020-10-23 RX ORDER — DIPHENHYDRAMINE HYDROCHLORIDE 50 MG/ML
INJECTION INTRAMUSCULAR; INTRAVENOUS
Status: COMPLETED
Start: 2020-10-23 | End: 2020-10-23

## 2020-10-23 RX ORDER — LABETALOL HYDROCHLORIDE 5 MG/ML
5 INJECTION, SOLUTION INTRAVENOUS EVERY 6 HOURS PRN
Status: DISCONTINUED | OUTPATIENT
Start: 2020-10-23 | End: 2020-10-24

## 2020-10-23 NOTE — PLAN OF CARE
A&O x 3 scheduled for an procedure in a.m NPO status in place. Remain on heparin drip with no side effects noted.  Will continue with  plan of care    Problem: Patient Centered Care  Goal: Patient preferences are identified and integrated in the patient's p patient reports new pain  - Anticipate increased pain with activity and pre-medicate as appropriate  Outcome: Progressing     Problem: SKIN/TISSUE INTEGRITY - ADULT  Goal: Skin integrity remains intact  Description: INTERVENTIONS  - Assess and document ris

## 2020-10-23 NOTE — PAYOR COMM NOTE
--------------  CONTINUED STAY REVIEW    Payor: Elis [address] #:  [id number]  Authorization Number: [id number]    Admit date: 10/20/20  Admit time: 1727    Admitting Physician: Shavonne Jiang MD  Attending Physician:  Shavonne Jiang MD  [facility] (90.1 kg)  10/22/20 0700 : 199 lb 12.8 oz (90.6 kg)  10/21/20 0625 : 199 lb 3.2 oz (90.4 kg)  10/20/20 1747 : 198 lb 4.8 oz (89.9 kg)  10/20/20 1322 : 200 lb (90.7 kg)  10/21/20 0801 : 199 lb (90.3 kg)  10/09/20 1011 : 197 lb (89.4 kg)        Exam   GEN: N collection over the dorsum of the small toe. No deep fluid collection/abscess. 3. Superficial soft tissue edema most pronounced dorsally-compatible with history of cellulitis 4. Nonspecific edema in the abductor hallucis muscle.  5. Mild osteoarthritis of traMADol HCl, HYDROcodone-acetaminophen, morphINE sulfate, ALPRAZolam                  Electronically signed by Federica Tierney MD at 10/23/2020 10:48 AM           MEDICATIONS ADMINISTERED IN LAST 1 DAY:  aspirin tab 325 mg     Date Action Dose Route User Will continue with  plan of care

## 2020-10-23 NOTE — PROGRESS NOTES
[facility] Hospitalist Progress Note     CC: Hospital Follow up    PCP: Nikolai Bonilla MD       Assessment/Plan:     Principal Problem:    Ischemic toe  Active Problems:    Hyperglycemia    Cellulitis of right lower extremity    80 y/o m w hx rectal ca c/w a kg)      Exam   GEN: NAD  HEENT: EOMI, PERRLA  Neck: Supple, no JVD  Pulm: CTAB, no crackles or wheezes  CV: RRR, no murmurs, 2+ peripheral pulses  ABD: Soft, non-tender, non-distended, +BS  MSK: strength 5/5 in all extremities  Neuro: Grossly normal, CN i IP joint of great toe. .    Dictated by (CST): Anselmo Barthel, MD on 10/21/2020 at 2:41 PM     Finalized by (CST): Anselmo Barthel, MD on 10/21/2020 at 2:51 PM          Cta Abd Pel Claus Leg Runoff Diaph To Toes Iv Contrast(cpt=75635)    Result Date: 10/20/2020

## 2020-10-23 NOTE — OPERATIVE REPORT
[facility]    PATIENTS NAME: Love Marcum  ATTENDING PHYSICIAN: Brad Cortez MD  OPERATING PHYSICIAN: Sophia Nunez MD  CSN: [id number]     LOCATION:  [address] [place] [address]  MRN: [id number]    YOB: 1950  ADMISSION DATE: 10/20/2020  OPERA He underwent a CT angiogram that revealed focal occlusion of his right superficial femoral artery as well as the posterior tibial artery and the peroneal artery. His anterior tibial artery was patent.   The patient did have multiple areas of calcifications angiogram was performed. This revealed patency of the aorta, both common iliac, internal and external iliac arteries. He had a right proximal common iliac artery  stent that was widely patent. There was moderate calcification present but no stenosis.   Ariel Sports thrombectomy of the occluded segment of the superficial femoral artery. Multiple passes were required. A repeat angiogram revealed areas of severe stenosis in the superficial femoral artery.   This required angioplasty using a 5 x 80 mm McDougal balloon fo was felt that the patient has been maximally revascularized. The sheath was then pulled back to the left iliac artery and an angiogram of the femoral artery access area revealed proper access in the mid common femoral area.  Therefore, the sheath was pulle

## 2020-10-23 NOTE — PLAN OF CARE
Patient nervous about procedure; continues to refuse pain medication per right lower extremity pain; able to walk; heparin drip stopped at 7am; patient stated on lisinopril; has not been getting since admitted; this RN notified attending; hydralazine order gait  - Educate and engage patient/family in tolerated activity level and precautions    Outcome: Progressing

## 2020-10-24 VITALS
HEART RATE: 86 BPM | SYSTOLIC BLOOD PRESSURE: 166 MMHG | WEIGHT: 197.69 LBS | DIASTOLIC BLOOD PRESSURE: 78 MMHG | BODY MASS INDEX: 26.78 KG/M2 | RESPIRATION RATE: 16 BRPM | HEIGHT: 72 IN | OXYGEN SATURATION: 95 % | TEMPERATURE: 99 F

## 2020-10-24 PROCEDURE — 85027 COMPLETE CBC AUTOMATED: CPT | Performed by: HOSPITALIST

## 2020-10-24 PROCEDURE — 80053 COMPREHEN METABOLIC PANEL: CPT | Performed by: HOSPITALIST

## 2020-10-24 RX ORDER — CLOPIDOGREL BISULFATE 75 MG/1
75 TABLET ORAL DAILY
Qty: 30 TABLET | Refills: 0 | Status: SHIPPED | OUTPATIENT
Start: 2020-10-24 | End: 2020-11-24

## 2020-10-24 RX ORDER — CLINDAMYCIN HYDROCHLORIDE 150 MG/1
450 CAPSULE ORAL 3 TIMES DAILY
Qty: 36 CAPSULE | Refills: 0 | Status: SHIPPED | OUTPATIENT
Start: 2020-10-24 | End: 2020-10-28

## 2020-10-24 RX ORDER — ASPIRIN 325 MG
325 TABLET ORAL DAILY
Qty: 30 TABLET | Refills: 0 | Status: SHIPPED | OUTPATIENT
Start: 2020-10-24 | End: 2020-11-23

## 2020-10-24 RX ORDER — HYDROCODONE BITARTRATE AND ACETAMINOPHEN 5; 325 MG/1; MG/1
1 TABLET ORAL EVERY 8 HOURS PRN
Qty: 24 TABLET | Refills: 0 | Status: SHIPPED | OUTPATIENT
Start: 2020-10-24 | End: 2020-11-01

## 2020-10-24 NOTE — PLAN OF CARE
This morning patient is comfortable, denies SOB/CP, and completed last IV vanco dose. Plan is for discharge today on Eliquis and plavix pending vascular consult.  Bed locked in lowest position, bed alarm activated, call light within reach, and frequent roun anxiety  - Utilize distraction and/or relaxation techniques  - Monitor for opioid side effects  - Notify MD/LIP if interventions unsuccessful or patient reports new pain  - Anticipate increased pain with activity and pre-medicate as appropriate  Outcome: C

## 2020-10-24 NOTE — IVS NOTE
Procedure hand off report given to Nancy Hopper RN. Procedure site remains dry and intact with no signs and symptoms of bleeding and hematoma. Bedrest maintained. Dr Dilcia Chandra spoke with pt post procedure.

## 2020-10-24 NOTE — PLAN OF CARE
Pt A&O x 4 s/p angiogram to RLE, left groin drg c/d/I. Pulses palpable to the ble, vitals stable. Problem: Patient/Family Goals  Goal: Patient/Family Long Term Goal  Description: Patient's Long Term Goal: Walk without pain.      Interventions:  - take dressing/incision, wound bed, drain sites and surrounding tissue  - Implement wound care per orders  - Initiate isolation precautions as appropriate  - Initiate Pressure Ulcer prevention bundle as indicated  Outcome: Progressing     Problem: Impaired Funct

## 2020-10-24 NOTE — BRIEF OP NOTE
[facility]     PATIENTS NAME: Danita Jon  ATTENDING PHYSICIAN: Adin Srinivasan MD  OPERATING PHYSICIAN: Lanny Whittaker MD  CSN: [id number]                                                                LOCATION:  [address] [place] [address]  MRN: [id number]

## 2020-10-24 NOTE — PROGRESS NOTES
[facility] Hospitalist Progress Note     CC: Hospital Follow up    PCP: Sabine Donaldson MD       Assessment/Plan:     Principal Problem:    Ischemic toe  Active Problems:    Hyperglycemia    Cellulitis of right lower extremity    78 y/o m w hx rectal ca c/w a : 197 lb 11.2 oz (89.7 kg)  10/23/20 0652 : 198 lb 11.2 oz (90.1 kg)  10/22/20 0700 : 199 lb 12.8 oz (90.6 kg)  10/21/20 0625 : 199 lb 3.2 oz (90.4 kg)  10/20/20 1747 : 198 lb 4.8 oz (89.9 kg)  10/20/20 1322 : 200 lb (90.7 kg)  10/21/20 0801 : 199 lb (90.3 Ricki Pryor MD on 10/21/2020 at 2:51 PM              Meds:     • Clopidogrel Bisulfate  75 mg Oral Daily   • apixaban  10 mg Oral BID   • vancomycin  1,250 mg Intravenous Q12H   • aspirin  325 mg Oral Daily   • tamsulosin HCl  0.4 mg Oral Daily   • Cody

## 2020-10-24 NOTE — PROGRESS NOTES
Pt is A/O x 4, denies pain, denies SOB. To be discharged today with home health. Went over discharge instructions and post angiogram/ischemic limb/smoke cessation education. Explained importance of reccomended follow up appointments.  Went over medications

## 2020-10-25 NOTE — PROGRESS NOTES
Doing well. The right foot is warm and well-perfused with easily palpable dorsalis pedis and posterior tibial pulses. The calf is soft  And the left groin access site is soft. Patient is on Eliquis and Plavix.   My plan is to continue to light Eliquis

## 2020-10-25 NOTE — DISCHARGE SUMMARY
General Medicine Discharge Summary     Patient ID:  Love Marcum  79year old  8/8/1950    Admit date: 10/20/2020    Discharge date and time: 10/24/2020  2:46 PM     Attending Physician: No att. providers found     Consults: [address] Tabs  Take 1 tablet (325 mg total) by mouth daily. clindamycin HCl 150 MG Caps  Commonly known as: CLEOCIN  Take 3 capsules (450 mg total) by mouth 3 (three) times daily for 4 days.      Clopidogrel Bisulfate 75 MG Tabs  Commonly known as: PLAVIX  Take [address] instructions:         Total Time Coordinating Care: Greater than 30 minutes    Patient had opportunity to ask questions and state understand and agree with therapeutic plan as outlined    Thank Fredrick Arora MD

## 2020-10-26 NOTE — PAYOR COMM NOTE
--------------  DISCHARGE REVIEW    Payor: [address] #:  [id number]  Authorization Number: [id number]    Admit date: 10/20/20  Admit time:  9358  Discharge Date: 10/24/2020  2:46 PM     Admitting Physician: Dalia Calderon MD  Attendin right peroneal artery   2. Suction thrombectomy and angioplasty of the right anterior tibial artery  3. Suction thrombectomy and angioplasty of the right posterior tibial artery              Operative Procedures:      Imaging: No results found.       Dispos UT) Caps           Where to Get Your Medications      These medications were sent to [facility] [address], [phone]  [address] [address]    Phone: [phone]   · apixaban 5 MG Tabs  · aspirin 325 MG

## 2020-11-02 PROBLEM — I99.8 PAIN OF RIGHT LOWER EXTREMITY DUE TO ISCHEMIA: Status: ACTIVE | Noted: 2020-11-02

## 2020-11-02 PROBLEM — M79.604 PAIN OF RIGHT LOWER EXTREMITY DUE TO ISCHEMIA: Status: ACTIVE | Noted: 2020-11-02

## 2021-04-01 ENCOUNTER — HOSPITAL ENCOUNTER (EMERGENCY)
Facility: HOSPITAL | Age: 71
Discharge: HOME | End: 2021-04-01
Attending: EMERGENCY MEDICINE
Payer: MEDICARE

## 2021-04-01 VITALS
OXYGEN SATURATION: 97 % | BODY MASS INDEX: 27 KG/M2 | HEART RATE: 81 BPM | RESPIRATION RATE: 20 BRPM | DIASTOLIC BLOOD PRESSURE: 76 MMHG | WEIGHT: 200 LBS | TEMPERATURE: 98 F | SYSTOLIC BLOOD PRESSURE: 132 MMHG

## 2021-04-01 DIAGNOSIS — M54.16 LUMBAR RADICULOPATHY: Primary | ICD-10-CM

## 2021-04-01 PROCEDURE — 99283 EMERGENCY DEPT VISIT LOW MDM: CPT

## 2021-04-01 RX ORDER — METHYLPREDNISOLONE 4 MG/1
TABLET ORAL
Qty: 1 PACKAGE | Refills: 0 | Status: SHIPPED | OUTPATIENT
Start: 2021-04-01 | End: 2021-04-01

## 2021-04-01 RX ORDER — METHYLPREDNISOLONE 4 MG/1
TABLET ORAL
Qty: 1 PACKAGE | Refills: 0 | Status: SHIPPED | OUTPATIENT
Start: 2021-04-01 | End: 2021-04-09 | Stop reason: ALTCHOICE

## 2021-04-01 NOTE — ED PROVIDER NOTES
Patient Seen in: [facility] AND [facility] Emergency Department    History   Patient presents with:  Pain      HPI    The patient presents to the ED complaining of pain numbness and burning in his right leg after stepping in a \"hole\" while walking across his y secondary to trauma         Medications :  (Not in a hospital admission)       Family History   Problem Relation Age of Onset   • Dementia Mother    • Other (parkinsons) Mother    • Other (cad) Father    • Other (lung cancer) Brother        Smoking Status: presenting problem noted.     Physical Exam     ED Triage Vitals [04/01/21 1642]   /81   Pulse 119   Resp 18   Temp 98 °F (36.7 °C)   Temp src    SpO2 97 %   O2 Device        All measures to prevent infection transmission during my interaction with th data to display      [facility]      04/01/21  1642 04/01/21  1745 04/01/21  1800   BP: 144/81 142/76 132/76   Pulse: 119 87 81   Resp: 18 20 20   Temp: 98 °F (36.7 °C)     SpO2: 97% 97% 97%   Weight: 90.7 kg       *I personally reviewed and interpreted all ED vi

## 2021-04-01 NOTE — ED INITIAL ASSESSMENT (HPI)
Patient complains of r leg pain for about a week after mistepping, states he got spinal shots about six days before that, states he had a stents placed in in his R leg October 2020

## 2021-04-01 NOTE — CONSULTS
"/78 (BP Location: Right arm)   Pulse 91   Temp 97.9  F (36.6  C) (Oral)   Resp 17   Ht 1.727 m (5' 8\")   Wt 99.8 kg (220 lb)   SpO2 97%   BMI 33.45 kg/m      - Serial troponins and stress test complete. - not met   - Seen and cleared by consultant if applicable - in progress  - Adequate pain control on oral analgesia - pt denies pain  - Vital signs normal or at patient baseline - met, VSS  - Safe disposition plan has been identified - not met   - Nurse to notify provider when observation goals have been met and patient is ready for discharge  " [facility] Infectious Disease Consult    Jessy Murtaza Patient Status:  Inpatient    1950 MRN [id number]   Location [facility] 1W Attending Dayron Caldwell MD   Hosp Day # 0 PCP Johan West.  Zachery Brownlee MD     Reason for Co MD DEVORAH at [address] [place] [address] N/A 9/21/2018    Performed by Zhane Castellanos MD at [facility] ENDOSCOPY   • Sergio Shaikh EA ADD      iliac stent   • OTHER      right iliac artery stent   • OTHER      right eye e fevers, malaise, night sweats and weight loss. Eyes: Negative for visual disturbance, irritation and redness. Ears, nose, mouth, throat, and face: Negative for hearing loss, tinnitus, nasal congestion, snoring, sore throat, hoarseness and voice change. adenopathy, no thyromegaly. Lungs: CTAB, normal and equal bilateral chest rise   Chest wall: No tenderness or deformity. Heart: Regular rate and rhythm, normal S1S2, no murmur.    Abdomen: soft, non-tender, non-distended, no masses, no guarding, no team,     4. Disposition: Follow pending cul, CT abd and pelvis, start IV Vanc, Discussed with patient, RN,will follow, Thank you for consulting [facility] for Madelaine Galaviz.   If you have any questions or concerns please call [facility] at [phone]

## 2021-07-23 PROBLEM — M54.16 LUMBAR RADICULOPATHY: Status: ACTIVE | Noted: 2021-07-23

## 2021-08-04 RX ORDER — GABAPENTIN 100 MG/1
100 CAPSULE ORAL 3 TIMES DAILY
COMMUNITY
End: 2021-11-04

## 2021-08-07 ENCOUNTER — LAB ENCOUNTER (OUTPATIENT)
Dept: LAB | Facility: HOSPITAL | Age: 71
DRG: 455 | End: 2021-08-07
Attending: ORTHOPAEDIC SURGERY
Payer: MEDICARE

## 2021-08-07 DIAGNOSIS — Z01.818 PRE-OP TESTING: ICD-10-CM

## 2021-08-07 LAB
ANTIBODY SCREEN: NEGATIVE
RH BLOOD TYPE: POSITIVE
SARS-COV-2 RNA RESP QL NAA+PROBE: NOT DETECTED

## 2021-08-07 PROCEDURE — 36415 COLL VENOUS BLD VENIPUNCTURE: CPT

## 2021-08-07 PROCEDURE — 86850 RBC ANTIBODY SCREEN: CPT

## 2021-08-07 PROCEDURE — 86901 BLOOD TYPING SEROLOGIC RH(D): CPT

## 2021-08-07 PROCEDURE — 86900 BLOOD TYPING SEROLOGIC ABO: CPT

## 2021-08-09 ENCOUNTER — HOSPITAL ENCOUNTER (INPATIENT)
Facility: HOSPITAL | Age: 71
LOS: 4 days | Discharge: NURSING, REHAB OR LONG-TERM CARE | DRG: 455 | End: 2021-08-13
Attending: ORTHOPAEDIC SURGERY | Admitting: ORTHOPAEDIC SURGERY
Payer: MEDICARE

## 2021-08-09 ENCOUNTER — ANESTHESIA EVENT (OUTPATIENT)
Dept: SURGERY | Facility: HOSPITAL | Age: 71
DRG: 455 | End: 2021-08-09
Payer: MEDICARE

## 2021-08-09 ENCOUNTER — ANESTHESIA (OUTPATIENT)
Dept: SURGERY | Facility: HOSPITAL | Age: 71
DRG: 455 | End: 2021-08-09
Payer: MEDICARE

## 2021-08-09 ENCOUNTER — APPOINTMENT (OUTPATIENT)
Dept: GENERAL RADIOLOGY | Facility: HOSPITAL | Age: 71
DRG: 455 | End: 2021-08-09
Attending: ORTHOPAEDIC SURGERY
Payer: MEDICARE

## 2021-08-09 DIAGNOSIS — Z01.818 PRE-OP TESTING: Primary | ICD-10-CM

## 2021-08-09 DIAGNOSIS — M54.16 LUMBAR RADICULOPATHY: ICD-10-CM

## 2021-08-09 DIAGNOSIS — M48.061 FORAMINAL STENOSIS OF LUMBAR REGION: ICD-10-CM

## 2021-08-09 DIAGNOSIS — M47.9 SPONDYLOSIS: ICD-10-CM

## 2021-08-09 PROBLEM — M54.10 RADICULOPATHY: Status: ACTIVE | Noted: 2021-08-09

## 2021-08-09 PROCEDURE — 4A11X4G MONITORING OF PERIPHERAL NERVOUS ELECTRICAL ACTIVITY, INTRAOPERATIVE, EXTERNAL APPROACH: ICD-10-PCS | Performed by: ORTHOPAEDIC SURGERY

## 2021-08-09 PROCEDURE — 01NB0ZZ RELEASE LUMBAR NERVE, OPEN APPROACH: ICD-10-PCS | Performed by: ORTHOPAEDIC SURGERY

## 2021-08-09 PROCEDURE — 0SG3071 FUSION OF LUMBOSACRAL JOINT WITH AUTOLOGOUS TISSUE SUBSTITUTE, POSTERIOR APPROACH, POSTERIOR COLUMN, OPEN APPROACH: ICD-10-PCS | Performed by: ORTHOPAEDIC SURGERY

## 2021-08-09 PROCEDURE — 0ST40ZZ RESECTION OF LUMBOSACRAL DISC, OPEN APPROACH: ICD-10-PCS | Performed by: ORTHOPAEDIC SURGERY

## 2021-08-09 PROCEDURE — 95860 NEEDLE EMG 1 EXTREMITY: CPT | Performed by: ORTHOPAEDIC SURGERY

## 2021-08-09 PROCEDURE — 0SG30AJ FUSION OF LUMBOSACRAL JOINT WITH INTERBODY FUSION DEVICE, POSTERIOR APPROACH, ANTERIOR COLUMN, OPEN APPROACH: ICD-10-PCS | Performed by: ORTHOPAEDIC SURGERY

## 2021-08-09 PROCEDURE — 76000 FLUOROSCOPY <1 HR PHYS/QHP: CPT | Performed by: ORTHOPAEDIC SURGERY

## 2021-08-09 PROCEDURE — 95938 SOMATOSENSORY TESTING: CPT | Performed by: ORTHOPAEDIC SURGERY

## 2021-08-09 DEVICE — ISOTIS STRAND PLUS+ 10CC: Type: IMPLANTABLE DEVICE | Site: BACK | Status: FUNCTIONAL

## 2021-08-09 DEVICE — SET SCREW
Type: IMPLANTABLE DEVICE | Site: BACK | Status: FUNCTIONAL
Brand: INVICTUS

## 2021-08-09 RX ORDER — METOPROLOL TARTRATE 5 MG/5ML
2.5 INJECTION INTRAVENOUS ONCE
Status: DISCONTINUED | OUTPATIENT
Start: 2021-08-09 | End: 2021-08-10 | Stop reason: HOSPADM

## 2021-08-09 RX ORDER — SODIUM CHLORIDE, SODIUM LACTATE, POTASSIUM CHLORIDE, CALCIUM CHLORIDE 600; 310; 30; 20 MG/100ML; MG/100ML; MG/100ML; MG/100ML
INJECTION, SOLUTION INTRAVENOUS CONTINUOUS
Status: DISCONTINUED | OUTPATIENT
Start: 2021-08-09 | End: 2021-08-11

## 2021-08-09 RX ORDER — CEFAZOLIN SODIUM/WATER 2 G/20 ML
2 SYRINGE (ML) INTRAVENOUS ONCE
Status: COMPLETED | OUTPATIENT
Start: 2021-08-09 | End: 2021-08-09

## 2021-08-09 RX ORDER — BUPIVACAINE HYDROCHLORIDE 5 MG/ML
INJECTION, SOLUTION EPIDURAL; INTRACAUDAL AS NEEDED
Status: DISCONTINUED | OUTPATIENT
Start: 2021-08-09 | End: 2021-08-09 | Stop reason: HOSPADM

## 2021-08-09 RX ORDER — HYDROCODONE BITARTRATE AND ACETAMINOPHEN 5; 325 MG/1; MG/1
2 TABLET ORAL AS NEEDED
Status: DISCONTINUED | OUTPATIENT
Start: 2021-08-09 | End: 2021-08-10 | Stop reason: HOSPADM

## 2021-08-09 RX ORDER — HYDROCODONE BITARTRATE AND ACETAMINOPHEN 5; 325 MG/1; MG/1
1 TABLET ORAL AS NEEDED
Status: DISCONTINUED | OUTPATIENT
Start: 2021-08-09 | End: 2021-08-10 | Stop reason: HOSPADM

## 2021-08-09 RX ORDER — MORPHINE SULFATE 4 MG/ML
2 INJECTION, SOLUTION INTRAMUSCULAR; INTRAVENOUS EVERY 10 MIN PRN
Status: DISCONTINUED | OUTPATIENT
Start: 2021-08-09 | End: 2021-08-10 | Stop reason: HOSPADM

## 2021-08-09 RX ORDER — HYDROMORPHONE HYDROCHLORIDE 1 MG/ML
0.4 INJECTION, SOLUTION INTRAMUSCULAR; INTRAVENOUS; SUBCUTANEOUS EVERY 5 MIN PRN
Status: DISCONTINUED | OUTPATIENT
Start: 2021-08-09 | End: 2021-08-10 | Stop reason: HOSPADM

## 2021-08-09 RX ORDER — DEXAMETHASONE SODIUM PHOSPHATE 4 MG/ML
VIAL (ML) INJECTION AS NEEDED
Status: DISCONTINUED | OUTPATIENT
Start: 2021-08-09 | End: 2021-08-09 | Stop reason: SURG

## 2021-08-09 RX ORDER — HYDROMORPHONE HYDROCHLORIDE 1 MG/ML
0.6 INJECTION, SOLUTION INTRAMUSCULAR; INTRAVENOUS; SUBCUTANEOUS EVERY 5 MIN PRN
Status: DISCONTINUED | OUTPATIENT
Start: 2021-08-09 | End: 2021-08-10 | Stop reason: HOSPADM

## 2021-08-09 RX ORDER — PROCHLORPERAZINE EDISYLATE 5 MG/ML
5 INJECTION INTRAMUSCULAR; INTRAVENOUS ONCE AS NEEDED
Status: ACTIVE | OUTPATIENT
Start: 2021-08-09 | End: 2021-08-09

## 2021-08-09 RX ORDER — MIDAZOLAM HYDROCHLORIDE 1 MG/ML
INJECTION INTRAMUSCULAR; INTRAVENOUS AS NEEDED
Status: DISCONTINUED | OUTPATIENT
Start: 2021-08-09 | End: 2021-08-09 | Stop reason: SURG

## 2021-08-09 RX ORDER — SCOLOPAMINE TRANSDERMAL SYSTEM 1 MG/1
1 PATCH, EXTENDED RELEASE TRANSDERMAL ONCE
Status: DISCONTINUED | OUTPATIENT
Start: 2021-08-09 | End: 2021-08-12

## 2021-08-09 RX ORDER — ONDANSETRON 2 MG/ML
INJECTION INTRAMUSCULAR; INTRAVENOUS AS NEEDED
Status: DISCONTINUED | OUTPATIENT
Start: 2021-08-09 | End: 2021-08-09 | Stop reason: SURG

## 2021-08-09 RX ORDER — MORPHINE SULFATE 4 MG/ML
4 INJECTION, SOLUTION INTRAMUSCULAR; INTRAVENOUS EVERY 10 MIN PRN
Status: DISCONTINUED | OUTPATIENT
Start: 2021-08-09 | End: 2021-08-10 | Stop reason: HOSPADM

## 2021-08-09 RX ORDER — HYDROMORPHONE HYDROCHLORIDE 1 MG/ML
0.2 INJECTION, SOLUTION INTRAMUSCULAR; INTRAVENOUS; SUBCUTANEOUS EVERY 5 MIN PRN
Status: DISCONTINUED | OUTPATIENT
Start: 2021-08-09 | End: 2021-08-10 | Stop reason: HOSPADM

## 2021-08-09 RX ORDER — SODIUM CHLORIDE, SODIUM LACTATE, POTASSIUM CHLORIDE, CALCIUM CHLORIDE 600; 310; 30; 20 MG/100ML; MG/100ML; MG/100ML; MG/100ML
INJECTION, SOLUTION INTRAVENOUS CONTINUOUS
Status: DISCONTINUED | OUTPATIENT
Start: 2021-08-09 | End: 2021-08-10 | Stop reason: HOSPADM

## 2021-08-09 RX ORDER — ONDANSETRON 2 MG/ML
4 INJECTION INTRAMUSCULAR; INTRAVENOUS ONCE AS NEEDED
Status: ACTIVE | OUTPATIENT
Start: 2021-08-09 | End: 2021-08-09

## 2021-08-09 RX ORDER — VANCOMYCIN HYDROCHLORIDE 1 G/20ML
INJECTION, POWDER, LYOPHILIZED, FOR SOLUTION INTRAVENOUS AS NEEDED
Status: DISCONTINUED | OUTPATIENT
Start: 2021-08-09 | End: 2021-08-09 | Stop reason: HOSPADM

## 2021-08-09 RX ORDER — BUPIVACAINE HYDROCHLORIDE 2.5 MG/ML
INJECTION, SOLUTION EPIDURAL; INFILTRATION; INTRACAUDAL AS NEEDED
Status: DISCONTINUED | OUTPATIENT
Start: 2021-08-09 | End: 2021-08-09 | Stop reason: HOSPADM

## 2021-08-09 RX ORDER — OXYCODONE HYDROCHLORIDE 5 MG/1
10 TABLET ORAL ONCE
Status: COMPLETED | OUTPATIENT
Start: 2021-08-09 | End: 2021-08-09

## 2021-08-09 RX ORDER — MORPHINE SULFATE 10 MG/ML
6 INJECTION, SOLUTION INTRAMUSCULAR; INTRAVENOUS EVERY 10 MIN PRN
Status: DISCONTINUED | OUTPATIENT
Start: 2021-08-09 | End: 2021-08-10 | Stop reason: HOSPADM

## 2021-08-09 RX ORDER — NALOXONE HYDROCHLORIDE 0.4 MG/ML
80 INJECTION, SOLUTION INTRAMUSCULAR; INTRAVENOUS; SUBCUTANEOUS AS NEEDED
Status: DISCONTINUED | OUTPATIENT
Start: 2021-08-09 | End: 2021-08-10 | Stop reason: HOSPADM

## 2021-08-09 RX ORDER — HALOPERIDOL 5 MG/ML
0.25 INJECTION INTRAMUSCULAR ONCE AS NEEDED
Status: ACTIVE | OUTPATIENT
Start: 2021-08-09 | End: 2021-08-09

## 2021-08-09 RX ORDER — ACETAMINOPHEN 500 MG
1000 TABLET ORAL ONCE
Status: COMPLETED | OUTPATIENT
Start: 2021-08-09 | End: 2021-08-09

## 2021-08-09 RX ORDER — LIDOCAINE HYDROCHLORIDE 10 MG/ML
INJECTION, SOLUTION EPIDURAL; INFILTRATION; INTRACAUDAL; PERINEURAL AS NEEDED
Status: DISCONTINUED | OUTPATIENT
Start: 2021-08-09 | End: 2021-08-09 | Stop reason: SURG

## 2021-08-09 RX ADMIN — CEFAZOLIN SODIUM/WATER 2 G: 2 G/20 ML SYRINGE (ML) INTRAVENOUS at 18:44:00

## 2021-08-09 RX ADMIN — MIDAZOLAM HYDROCHLORIDE 2 MG: 1 INJECTION INTRAMUSCULAR; INTRAVENOUS at 18:39:00

## 2021-08-09 RX ADMIN — LIDOCAINE HYDROCHLORIDE 50 MG: 10 INJECTION, SOLUTION EPIDURAL; INFILTRATION; INTRACAUDAL; PERINEURAL at 18:39:00

## 2021-08-09 RX ADMIN — DEXAMETHASONE SODIUM PHOSPHATE 4 MG: 4 MG/ML VIAL (ML) INJECTION at 18:39:00

## 2021-08-09 RX ADMIN — SODIUM CHLORIDE, SODIUM LACTATE, POTASSIUM CHLORIDE, CALCIUM CHLORIDE: 600; 310; 30; 20 INJECTION, SOLUTION INTRAVENOUS at 22:27:00

## 2021-08-09 RX ADMIN — ONDANSETRON 4 MG: 2 INJECTION INTRAMUSCULAR; INTRAVENOUS at 18:39:00

## 2021-08-09 NOTE — ANESTHESIA PREPROCEDURE EVALUATION
Anesthesia PreOp Note    HPI:     Anabelle Pedro is a 70year old male who presents for preoperative consultation requested by: Erick Lockett MD    Date of Surgery: 8/9/2021    Procedure(s):  TRANSFORAMINAL INTERBODY FUSION RIGHT SIDE LUMBAR (peripheral artery disease) (HCC)      Hyperlipidemia      GERD (gastroesophageal reflux disease)      HTN (hypertension)        Past Medical History:   Diagnosis Date   • Atherosclerosis of aorta ([facility] [address]    • Carcinoma in situ of rectosigmoid junction 11/11 24 Hr, Take 1 tablet (25 mg total) by mouth daily. , Disp: 90 tablet, Rfl: 3, 8/9/2021 at 0930      lactated ringers infusion, , Intravenous, Continuous, Geoffrey Comer MD, Last Rate: 20 mL/hr at 08/09/21 1207, New Bag at 08/09/21 1207  metoprolol tar     Minutes of Exercise per Session:   Stress:       Feeling of Stress :   Social Connections:       Frequency of Communication with Friends and Family:       Frequency of Social Gatherings with Friends and Family:       Attends [employer] Services:       Endo/Other    (+) arthritis  Abdominal      Other findings: R prosthetic eye        Anesthesia Plan:   ASA:  3  Plan:   General  Airway:  ETT  Post-op Pain Management: IV analgesics and Oral pain medication  Informed Consent Plan and Risks Discussed With:

## 2021-08-09 NOTE — H&P (VIEW-ONLY)
Patient: Fei Masters  Medical Record Number: [id number]  Referring Physician:  No ref.  provider found  PCP: Edward Ayala MD    HISTORY OF CHIEF COMPLAINT:    CC: Right > LLE Symptoms    HPI: Fei Masters is a 70year old ma • PAD (peripheral artery disease) (HCC)    • Personal history of antineoplastic chemotherapy    • Tobacco abuse    • Visual impairment     right eye prosthesis      Past Surgical History:   Procedure Laterality Date   • COLONOSCOPY N/A 8/7/2018    Procedur

## 2021-08-09 NOTE — ANESTHESIA PROCEDURE NOTES
Airway  Date/Time: 8/9/2021 6:48 PM  Urgency: Elective      General Information and Staff    Patient location during procedure: OR  Anesthesiologist: Segundo Villela MD  Performed: anesthesiologist     Indications and Patient Condition  Indications for airw

## 2021-08-09 NOTE — INTERVAL H&P NOTE
Pre-op Diagnosis: Spondylosis [M47.9]  Lumbar radiculopathy [M54.16]  Foraminal stenosis of lumbar region [M48.061]    The above referenced H&P was reviewed by Kp Sprague MD on 8/9/2021, the patient was examined and no significant changes have occ

## 2021-08-10 LAB
ANION GAP SERPL CALC-SCNC: 7 MMOL/L (ref 0–18)
BUN BLD-MCNC: 14 MG/DL (ref 7–18)
BUN/CREAT SERPL: 12 (ref 10–20)
CALCIUM BLD-MCNC: 9 MG/DL (ref 8.5–10.1)
CHLORIDE SERPL-SCNC: 108 MMOL/L (ref 98–112)
CO2 SERPL-SCNC: 26 MMOL/L (ref 21–32)
CREAT BLD-MCNC: 1.17 MG/DL
GLUCOSE BLD-MCNC: 154 MG/DL (ref 70–99)
HCT VFR BLD AUTO: 41.5 %
HGB BLD-MCNC: 13.7 G/DL
OSMOLALITY SERPL CALC.SUM OF ELEC: 296 MOSM/KG (ref 275–295)
POTASSIUM SERPL-SCNC: 4.6 MMOL/L (ref 3.5–5.1)
SODIUM SERPL-SCNC: 141 MMOL/L (ref 136–145)

## 2021-08-10 PROCEDURE — 97530 THERAPEUTIC ACTIVITIES: CPT

## 2021-08-10 PROCEDURE — 97166 OT EVAL MOD COMPLEX 45 MIN: CPT

## 2021-08-10 PROCEDURE — 97116 GAIT TRAINING THERAPY: CPT

## 2021-08-10 PROCEDURE — 80048 BASIC METABOLIC PNL TOTAL CA: CPT | Performed by: ORTHOPAEDIC SURGERY

## 2021-08-10 PROCEDURE — 85018 HEMOGLOBIN: CPT | Performed by: ORTHOPAEDIC SURGERY

## 2021-08-10 PROCEDURE — 85014 HEMATOCRIT: CPT | Performed by: ORTHOPAEDIC SURGERY

## 2021-08-10 PROCEDURE — 97162 PT EVAL MOD COMPLEX 30 MIN: CPT

## 2021-08-10 RX ORDER — HYDROMORPHONE HYDROCHLORIDE 1 MG/ML
0.8 INJECTION, SOLUTION INTRAMUSCULAR; INTRAVENOUS; SUBCUTANEOUS EVERY 2 HOUR PRN
Status: DISCONTINUED | OUTPATIENT
Start: 2021-08-10 | End: 2021-08-11

## 2021-08-10 RX ORDER — DIPHENHYDRAMINE HYDROCHLORIDE 50 MG/ML
25 INJECTION INTRAMUSCULAR; INTRAVENOUS EVERY 4 HOURS PRN
Status: DISCONTINUED | OUTPATIENT
Start: 2021-08-10 | End: 2021-08-11

## 2021-08-10 RX ORDER — SODIUM PHOSPHATE, DIBASIC AND SODIUM PHOSPHATE, MONOBASIC 7; 19 G/133ML; G/133ML
1 ENEMA RECTAL ONCE AS NEEDED
Status: DISCONTINUED | OUTPATIENT
Start: 2021-08-10 | End: 2021-08-13

## 2021-08-10 RX ORDER — POLYETHYLENE GLYCOL 3350 17 G/17G
17 POWDER, FOR SOLUTION ORAL DAILY PRN
Status: DISCONTINUED | OUTPATIENT
Start: 2021-08-10 | End: 2021-08-13

## 2021-08-10 RX ORDER — CEFAZOLIN SODIUM/WATER 2 G/20 ML
2 SYRINGE (ML) INTRAVENOUS EVERY 8 HOURS
Status: COMPLETED | OUTPATIENT
Start: 2021-08-10 | End: 2021-08-10

## 2021-08-10 RX ORDER — HYDROCODONE BITARTRATE AND ACETAMINOPHEN 10; 325 MG/1; MG/1
1 TABLET ORAL EVERY 4 HOURS PRN
Status: DISCONTINUED | OUTPATIENT
Start: 2021-08-10 | End: 2021-08-13

## 2021-08-10 RX ORDER — GABAPENTIN 100 MG/1
100 CAPSULE ORAL 3 TIMES DAILY
Status: DISCONTINUED | OUTPATIENT
Start: 2021-08-10 | End: 2021-08-13

## 2021-08-10 RX ORDER — ATORVASTATIN CALCIUM 40 MG/1
80 TABLET, FILM COATED ORAL DAILY
Status: DISCONTINUED | OUTPATIENT
Start: 2021-08-10 | End: 2021-08-13

## 2021-08-10 RX ORDER — HYDROMORPHONE HYDROCHLORIDE 1 MG/ML
0.2 INJECTION, SOLUTION INTRAMUSCULAR; INTRAVENOUS; SUBCUTANEOUS EVERY 2 HOUR PRN
Status: DISCONTINUED | OUTPATIENT
Start: 2021-08-10 | End: 2021-08-11

## 2021-08-10 RX ORDER — BISACODYL 10 MG
10 SUPPOSITORY, RECTAL RECTAL
Status: DISCONTINUED | OUTPATIENT
Start: 2021-08-10 | End: 2021-08-13

## 2021-08-10 RX ORDER — METOPROLOL SUCCINATE 25 MG/1
25 TABLET, EXTENDED RELEASE ORAL DAILY
Status: DISCONTINUED | OUTPATIENT
Start: 2021-08-10 | End: 2021-08-13

## 2021-08-10 RX ORDER — DOCUSATE SODIUM 100 MG/1
100 CAPSULE, LIQUID FILLED ORAL 2 TIMES DAILY
Status: DISCONTINUED | OUTPATIENT
Start: 2021-08-10 | End: 2021-08-13

## 2021-08-10 RX ORDER — ONDANSETRON 2 MG/ML
4 INJECTION INTRAMUSCULAR; INTRAVENOUS EVERY 4 HOURS PRN
Status: ACTIVE | OUTPATIENT
Start: 2021-08-10 | End: 2021-08-11

## 2021-08-10 RX ORDER — PROCHLORPERAZINE EDISYLATE 5 MG/ML
10 INJECTION INTRAMUSCULAR; INTRAVENOUS EVERY 6 HOURS PRN
Status: ACTIVE | OUTPATIENT
Start: 2021-08-10 | End: 2021-08-12

## 2021-08-10 RX ORDER — SENNOSIDES 8.6 MG
17.2 TABLET ORAL NIGHTLY
Status: DISCONTINUED | OUTPATIENT
Start: 2021-08-10 | End: 2021-08-13

## 2021-08-10 RX ORDER — HYDROCODONE BITARTRATE AND ACETAMINOPHEN 10; 325 MG/1; MG/1
2 TABLET ORAL EVERY 4 HOURS PRN
Status: DISCONTINUED | OUTPATIENT
Start: 2021-08-10 | End: 2021-08-13

## 2021-08-10 RX ORDER — HYDROMORPHONE HYDROCHLORIDE 1 MG/ML
0.4 INJECTION, SOLUTION INTRAMUSCULAR; INTRAVENOUS; SUBCUTANEOUS EVERY 2 HOUR PRN
Status: DISCONTINUED | OUTPATIENT
Start: 2021-08-10 | End: 2021-08-11

## 2021-08-10 RX ORDER — ACETAMINOPHEN 325 MG/1
650 TABLET ORAL EVERY 4 HOURS PRN
Status: DISCONTINUED | OUTPATIENT
Start: 2021-08-10 | End: 2021-08-13

## 2021-08-10 RX ORDER — DIPHENHYDRAMINE HCL 25 MG
25 CAPSULE ORAL EVERY 4 HOURS PRN
Status: DISCONTINUED | OUTPATIENT
Start: 2021-08-10 | End: 2021-08-13

## 2021-08-10 RX ORDER — LISINOPRIL 20 MG/1
20 TABLET ORAL DAILY
Status: DISCONTINUED | OUTPATIENT
Start: 2021-08-10 | End: 2021-08-13

## 2021-08-10 RX ORDER — CYCLOBENZAPRINE HCL 5 MG
2.5 TABLET ORAL EVERY 6 HOURS PRN
Status: DISCONTINUED | OUTPATIENT
Start: 2021-08-10 | End: 2021-08-13

## 2021-08-10 NOTE — ANESTHESIA POSTPROCEDURE EVALUATION
Patient: Kamilah Sultana    Procedure Summary     Date: 08/09/21 Room / Location: [address] [place] [address] MAIN OR 09 / [address] [place] [address] MAIN OR    Anesthesia Start: 2416 Anesthesia Stop:     Procedure: TRANSFORAMINAL INTERBODY FUSION RIGHT SIDE LUMBAR 5 - SACRUM 1 WITH ILIAC BONE C

## 2021-08-10 NOTE — PROGRESS NOTES
Spine Service Progress Note    24hrs/Events: POD#1 s/p MIS TLIF    Subjective: Improvement in leg symptoms. Some confusion last night better today. Endorses back pain.      Objective:   /35 (BP Location: Right arm)   Pulse 76   Temp 97.7 °F (36.5

## 2021-08-10 NOTE — PLAN OF CARE
Problem: Patient Centered Care  Goal: Patient preferences are identified and integrated in the patient's plan of care  Description: Interventions:  - What would you like us to know as we care for you?   - Provide timely, complete, and accurate informatio and payer coverage in collaboration with the physician and health care team  - Communicate with and update the patient/family, physician, and health care team regarding progress on the discharge plan  - Arrange appropriate transportation to post-acute kevin cath. Denies pain/discomfort currently. Call light within reach. Bed in lowest and locked position.

## 2021-08-10 NOTE — PHYSICAL THERAPY NOTE
MD order received, chart reviewed. Per RN Alea Valdez patient has elevated BP and remains somewhat confused.  Has medicated pt and requesting check back later AM. Will follow up

## 2021-08-10 NOTE — PHYSICAL THERAPY NOTE
PHYSICAL THERAPY EVALUATION - INPATIENT     Room Number: 407/407-A  Evaluation Date: 8/10/2021  Type of Evaluation: Initial   Physician Order: PT Eval and Treat    Presenting Problem: RLE radiculopathy, s/p L5-S1 MIS-TLIF  Reason for Therapy: Mobility Dys return home alone so rehab is the recommendation. Patient will benefit from continued IP PT services to address these deficits in preparation for discharge.     DISCHARGE RECOMMENDATIONS  PT Discharge Recommendations: Sub-acute rehabilitation    PLAN  PT HOME SITUATION  Type of Home: Apartment   Home Layout: One level (elevator building)  Stairs to Enter : 0     Stairs to Bedroom: 0       Lives With: Alone (reports he has neighbors but no one can help)  Drives: Yes  Patient Owned Equipment: None (bateman Position: Sitting    O2 WALK  Oxygen Therapy  SPO2% on Room Air at Rest: 96  SPO2% Ambulation on Room Air: 96    AM-PAC '6-Clicks' INPATIENT SHORT FORM - BASIC MOBILITY  How much difficulty does the patient currently have. ..  -   Turning over in bed (inclu assistance following spinal precautions and log roll technique     Goal #1   Current Status    Goal #2 Patient is able to demonstrate transfers Sit to/from Stand at assistance level: minimum assistance with walker - rolling     Goal #2  Current Status    G

## 2021-08-10 NOTE — BRIEF OP NOTE
Pre-Operative Diagnosis: Spondylosis [M47.9]  Lumbar radiculopathy [M54.16]  Foraminal stenosis of lumbar region [M48.061]     Post-Operative Diagnosis: Spondylosis [M47. 9]Lumbar radiculopathy [M54.16]Foraminal stenosis of lumbar region [M48.061]      Proc

## 2021-08-10 NOTE — PLAN OF CARE
Problem: PAIN - ADULT  Goal: Verbalizes/displays adequate comfort level or patient's stated pain goal  Description: INTERVENTIONS:  - Encourage pt to monitor pain and request assistance  - Assess pain using appropriate pain scale  - Administer analgesics b pt/family on patient safety including physical limitations  - Instruct pt to call for assistance with activity based on assessment  - Modify environment to reduce risk of injury  - Provide assistive devices as appropriate  - Consider OT/PT consult to mina

## 2021-08-10 NOTE — PACU NOTE
Pt is confused, does not know where he is and unable to tell who is his family member that I need to contact. He will say yes if he is asked if he has numbness to his legs. Moves all extremeties without problem.  Called pt's daughter to ask and said she spo

## 2021-08-10 NOTE — CONSULTS
General Medicine Consult      Reason for consult: medical management     Consulted by: ortho spine    PCP: Adam Haddad MD      History of Present Illness: Patient is a 70year old male with PMH sig for Rectal adenocarcinoma, PAD s/p stent to right Rfl:   diphenoxylate-atropine 2.5-0.025 MG Oral Tab, Take 1 tablet by mouth 4 (four) times daily as needed for Diarrhea., Disp: 180 tablet, Rfl: 0  LISINOPRIL 20 MG Oral Tab, TAKE 1 TABLET BY MOUTH EVERY DAY  , Disp: 90 tablet, Rfl: 2  ATORVASTATIN 80 MG O Wt 210 lb (95.3 kg)   SpO2 95%   BMI 27.71 kg/m²   General:  Alert, no distress, appears stated age. Head:  Normocephalic, without obvious abnormality, atraumatic. Eyes:  Sclera anicteric, No conjunctival pallor,     Nose: Nares normal. Septum midline. scd, AC when ok with ortho  Atrophy Prophylaxis IS  Lines/Monitors: piv    Dispo: pending clinical course  Code status: FULL    Patient and/or patient's family given opportunity to ask questions and note understanding and agreeing with therapeutic plan as

## 2021-08-10 NOTE — OCCUPATIONAL THERAPY NOTE
OCCUPATIONAL THERAPY EVALUATION - INPATIENT      Room Number: 407/407-A  Evaluation Date: 8/10/2021  Type of Evaluation: Initial  Presenting Problem: L4-5 S1 TLIF    Physician Order: IP Consult to Occupational Therapy  Reason for Therapy: ADL/IADL Dysfunct been calculated based on documentation in the [facility] '6 clicks' Inpatient Daily Activity Short Form. Research supports that patients with this level of impairment may benefit from DORIS.     DISCHARGE RECOMMENDATIONS  OT Discharge Recommendations: Sub-acute r building)  Lives With: Alone (reports he has neighbors but no one can help)    Toilet and Equipment: Standard height toilet  Shower/Tub and Equipment: Walk-in shower; Shower chair  Other Equipment:  (walkers)    Occupation/Status:  (retired)  Drives: Yes  P Taking care of personal grooming such as brushing teeth?: A Little  -   Eating meals?: A Little    AM-PAC Score:  Score: 15  Approx Degree of Impairment: 56.46%  Standardized Score (AM-PAC Scale): 34.69  CMS Modifier (G-Code): CK    FUNCTIONAL TRANSFER ASS

## 2021-08-11 LAB
ANION GAP SERPL CALC-SCNC: 4 MMOL/L (ref 0–18)
BUN BLD-MCNC: 15 MG/DL (ref 7–18)
BUN/CREAT SERPL: 13.4 (ref 10–20)
CALCIUM BLD-MCNC: 8.5 MG/DL (ref 8.5–10.1)
CHLORIDE SERPL-SCNC: 108 MMOL/L (ref 98–112)
CO2 SERPL-SCNC: 30 MMOL/L (ref 21–32)
CREAT BLD-MCNC: 1.12 MG/DL
GLUCOSE BLD-MCNC: 123 MG/DL (ref 70–99)
OSMOLALITY SERPL CALC.SUM OF ELEC: 296 MOSM/KG (ref 275–295)
POTASSIUM SERPL-SCNC: 4.2 MMOL/L (ref 3.5–5.1)
SODIUM SERPL-SCNC: 142 MMOL/L (ref 136–145)

## 2021-08-11 PROCEDURE — 97110 THERAPEUTIC EXERCISES: CPT

## 2021-08-11 PROCEDURE — 97116 GAIT TRAINING THERAPY: CPT

## 2021-08-11 PROCEDURE — 80048 BASIC METABOLIC PNL TOTAL CA: CPT | Performed by: ORTHOPAEDIC SURGERY

## 2021-08-11 PROCEDURE — 97530 THERAPEUTIC ACTIVITIES: CPT

## 2021-08-11 NOTE — PROGRESS NOTES
[facility] Hospitalist Progress Note     CC: Hospital Follow up    PCP: Tj Su MD       Assessment/Plan:     Active Problems:    Radiculopathy    Patient is a 70year old male with PMH sig for Rectal adenocarcinoma, PAD s/p stent to right common lamberto nontender, nondistended,    MSK:  , no clubbing, no cyanosis  Skin: no rashes or lesions  Neuro: AO*3, motor intact, no sensory deficits  Psyc: appropriate mood and affect      Data Review:       Labs:     Recent Labs   Lab 08/10/21  0734   HGB 13.7   HCT

## 2021-08-11 NOTE — CM/SW NOTE
08/11/21 1500   CM/SW Referral Data   Referral Source Physician   Reason for Referral Discharge planning   Informant Patient   Patient Info   Patient's Current Mental Status at Time of Assessment Alert;Oriented   Patient's Home Environment Condo/Apt no

## 2021-08-11 NOTE — PHYSICAL THERAPY NOTE
PHYSICAL THERAPY TREATMENT NOTE - INPATIENT     Room Number: 407/407-A       Presenting Problem: RLE radiculopathy, s/p L5-S1 MIS-TLIF    Problem List  Active Problems:    Radiculopathy      PHYSICAL THERAPY ASSESSMENT     Patient in bed sleepy, easily cris Lying    O2 WALK  Oxygen Therapy  SPO2% on Oxygen at Rest: 96  At rest oxygen flow (liters per minute): 2  SPO2% Ambulation on Room Air: 94    AM-PAC '6-Clicks' INPATIENT SHORT FORM - BASIC MOBILITY  How much difficulty does the patient currently have. .. with assist device: walker - rolling at assistance level: supervision   Goal #3   Current Status Min A RW   Goal #4 Patient will negotiate one curb w/ assistive device and minimal assistance   Goal #4   Current Status NT   Goal #5 Patient to demonstrate in

## 2021-08-11 NOTE — PROGRESS NOTES
Spine Service Progress Note    24hrs/Events: POD#2 s/p MIS TLIF worked with PT, rec DORIS. Subjective: Improvement in leg symptoms. Some confusion last night better today. Endorses back pain.      Objective:   /70 (BP Location: Right arm)   Puls

## 2021-08-12 LAB
ANION GAP SERPL CALC-SCNC: 4 MMOL/L (ref 0–18)
BUN BLD-MCNC: 12 MG/DL (ref 7–18)
BUN/CREAT SERPL: 13.5 (ref 10–20)
CALCIUM BLD-MCNC: 8.7 MG/DL (ref 8.5–10.1)
CHLORIDE SERPL-SCNC: 104 MMOL/L (ref 98–112)
CO2 SERPL-SCNC: 28 MMOL/L (ref 21–32)
CREAT BLD-MCNC: 0.89 MG/DL
GLUCOSE BLD-MCNC: 121 MG/DL (ref 70–99)
OSMOLALITY SERPL CALC.SUM OF ELEC: 283 MOSM/KG (ref 275–295)
POTASSIUM SERPL-SCNC: 3.7 MMOL/L (ref 3.5–5.1)
SODIUM SERPL-SCNC: 136 MMOL/L (ref 136–145)

## 2021-08-12 PROCEDURE — 97530 THERAPEUTIC ACTIVITIES: CPT

## 2021-08-12 PROCEDURE — 97116 GAIT TRAINING THERAPY: CPT

## 2021-08-12 PROCEDURE — 80048 BASIC METABOLIC PNL TOTAL CA: CPT | Performed by: ORTHOPAEDIC SURGERY

## 2021-08-12 RX ORDER — PSEUDOEPHEDRINE HCL 30 MG
100 TABLET ORAL 2 TIMES DAILY
Qty: 30 CAPSULE | Refills: 0 | Status: SHIPPED | OUTPATIENT
Start: 2021-08-12 | End: 2021-08-24

## 2021-08-12 RX ORDER — HYDROCODONE BITARTRATE AND ACETAMINOPHEN 5; 325 MG/1; MG/1
1 TABLET ORAL EVERY 4 HOURS PRN
Qty: 10 TABLET | Refills: 0 | Status: SHIPPED | OUTPATIENT
Start: 2021-08-12

## 2021-08-12 NOTE — PROGRESS NOTES
Spine Service Progress Note    24hrs/Events: POD#3 s/p MIS TLIF worked with PT, rec DORIS. Subjective: Improvement in leg symptoms. Endorses back pain.      Objective:   BP (!) 165/94 (BP Location: Right arm)   Pulse 92   Temp 97.7 °F (36.5 °C) (Oral)

## 2021-08-12 NOTE — PROGRESS NOTES
[facility] Hospitalist Progress Note     CC: Hospital Follow up    PCP: Tata Hill MD       Assessment/Plan:     Active Problems:    Radiculopathy    Patient is a 70year old male with PMH sig for Rectal adenocarcinoma, PAD s/p stent to right common lamberto edema  Abd: Abdomen soft, nontender, nondistended,    MSK:  , no clubbing, no cyanosis  Skin: no rashes or lesions  Neuro: AO*3, motor intact, no sensory deficits  Psyc: appropriate mood and affect      Data Review:       Labs:     Recent Labs   Lab 08/10/

## 2021-08-12 NOTE — PLAN OF CARE
Problem: PAIN - ADULT  Goal: Verbalizes/displays adequate comfort level or patient's stated pain goal  Description: INTERVENTIONS:  - Encourage pt to monitor pain and request assistance  - Assess pain using appropriate pain scale  - Administer analgesics pt/family on patient safety including physical limitations  - Instruct pt to call for assistance with activity based on assessment  - Modify environment to reduce risk of injury  - Provide assistive devices as appropriate  - Consider OT/PT consult to mina

## 2021-08-12 NOTE — CM/SW NOTE
YING followed up on DC planning. YING received call from the RN who states pt's plan is [facility] for SNF.      YING reserved [facility] in [address] and submitted insurance auth to [employer]     Case ID: [id number]    [facility] looking to see if th

## 2021-08-12 NOTE — PHYSICAL THERAPY NOTE
PHYSICAL THERAPY TREATMENT NOTE - INPATIENT     Room Number: 407/407-A       Presenting Problem: RLE radiculopathy, s/p L5-S1 MIS-TLIF    Problem List  Active Problems:    Radiculopathy      PHYSICAL THERAPY ASSESSMENT     Chart reviewed.  JAMES Martínez approved Recommendations: Sub-acute rehabilitation     PLAN  PT Treatment Plan: Bed mobility; Body mechanics; Endurance; Patient education;Gait training;Strengthening;Transfer training;Balance training    SUBJECTIVE  Pt was agreeable to therapy session.      OBJECTIVE precautions and log roll technique     Goal #1   Current Status Mod A   Goal #2 Patient is able to demonstrate transfers Sit to/from Stand at assistance level: minimum assistance with walker - rolling     Goal #2  Current Status Mod A   Goal #3 Patient is

## 2021-08-12 NOTE — PLAN OF CARE
Problem: Patient Centered Care  Goal: Patient preferences are identified and integrated in the patient's plan of care  Description: Interventions:  - What would you like us to know as we care for you?  I'm ok  - Provide timely, complete, and accurate info are available. Using gel ice packs PRN.       Problem: DISCHARGE PLANNING - CASE MANAGEMENT  Goal: Discharge to post-acute care or home with appropriate resources  Description: INTERVENTIONS:  - Conduct assessment to determine patient/family and health car transferring and ambulating w/ or w/o assistive devices  - Assist with transfers and ambulation using safe patient handling equipment as needed  - Ensure adequate protection for wounds/incisions during mobilization  - Obtain PT/OT consults as needed  - Adv

## 2021-08-13 VITALS
RESPIRATION RATE: 16 BRPM | TEMPERATURE: 99 F | OXYGEN SATURATION: 94 % | HEART RATE: 84 BPM | DIASTOLIC BLOOD PRESSURE: 85 MMHG | HEIGHT: 73 IN | WEIGHT: 210 LBS | SYSTOLIC BLOOD PRESSURE: 158 MMHG | BODY MASS INDEX: 27.83 KG/M2

## 2021-08-13 PROCEDURE — 97530 THERAPEUTIC ACTIVITIES: CPT

## 2021-08-13 PROCEDURE — 97116 GAIT TRAINING THERAPY: CPT

## 2021-08-13 NOTE — PLAN OF CARE
Problem: PAIN - ADULT  Goal: Verbalizes/displays adequate comfort level or patient's stated pain goal  Description: INTERVENTIONS:  - Encourage pt to monitor pain and request assistance  - Assess pain using appropriate pain scale  - Administer analgesics assessment.  - Educate pt/family on patient safety including physical limitations  - Instruct pt to call for assistance with activity based on assessment  - Modify environment to reduce risk of injury  - Provide assistive devices as appropriate  - Consider

## 2021-08-13 NOTE — DISCHARGE SUMMARY
[facility] Internal Medicine Discharge Summary   Patient ID:  Flavio Guillaume  [id number]  65 year old  8/8/1950    Admit date: 8/9/2021    Discharge date and time: 8/13/21    Attending Physician: Bang Mathur MD     Primary Care Physician: Sara Flores medications  · HYDROcodone-acetaminophen 5-325 MG Tabs       HPI per chart  Patient is a 70year old male with PMH sig for Rectal adenocarcinoma, PAD s/p stent to right common iliac and right superficial femoral artery, HLD, HTN, hs of DVT, atherosclerosis right side Lumabr 5 - sacrum. TECHNIQUE:  4 fluoroscopic views of the lumbosacral spine are obtained  FLUOROSCOPY EXPOSURE TIME:   161.8 seconds  FINDINGS: .   Posterior fusion identified at L5-S1 with bilateral intrapedicular screws and intervertebral dis

## 2021-08-13 NOTE — PROGRESS NOTES
Spine Service Progress Note    24hrs/Events: POD#4 s/p MIS TLIF worked with PT, rec DORIS. Subjective: Improvement in leg symptoms. Endorses back pain.      Objective:   /85 (BP Location: Right arm)   Pulse 84   Temp 98.5 °F (36.9 °C) (Oral)   R

## 2021-08-13 NOTE — PHYSICAL THERAPY NOTE
PHYSICAL THERAPY TREATMENT NOTE - INPATIENT     Room Number: 407/407-A       Presenting Problem: RLE radiculopathy, s/p L5-S1 MIS-TLIF    Problem List  Active Problems:    Radiculopathy      PHYSICAL THERAPY ASSESSMENT     Chart reviewed.  JAMES Paige approved Plan: Bed mobility; Body mechanics; Endurance; Patient education;Gait training;Strengthening;Transfer training;Balance training    SUBJECTIVE  Pt was agreeable to therapy session.      OBJECTIVE  Precautions: Spine;Bed/chair alarm    WEIGHT BEARING RESTRICTION spinal precautions and log roll technique     Goal #1   Current Status Mod A   Goal #2 Patient is able to demonstrate transfers Sit to/from Stand at assistance level: minimum assistance with walker - rolling     Goal #2  Current Status Mod A   Goal #3 Marika

## 2021-08-13 NOTE — PLAN OF CARE
Postop day 3 to 4. Up with 1 assist and rolling walker. Patient declining PRN Norco but accepted PRN Tylenol prior to bed. Plan for discharge to [facility] on Fri.     Addendum 0448: Patient agreed to take Norco after increased pain and discomf request assistance  - Assess pain using appropriate pain scale  - Administer analgesics based on type and severity of pain and evaluate response  - Implement non-pharmacological measures as appropriate and evaluate response  - Consider cultural and social injury  - Provide assistive devices as appropriate  - Consider OT/PT consult to assist with strengthening/mobility  - Encourage toileting schedule  Outcome: Progressing     Problem: MUSCULOSKELETAL - ADULT  Goal: Return mobility to safest level of function

## 2021-08-13 NOTE — CM/SW NOTE
W followed up on DC planning. SW checked [employer] authorization case for pt    Case ID: [id number]     Currently decision status still active and pending, auth not provided.      Will continue to monitor and wait for [employer] needed

## 2021-08-16 ENCOUNTER — EXTERNAL FACILITY (OUTPATIENT)
Dept: INTERNAL MEDICINE CLINIC | Facility: CLINIC | Age: 71
End: 2021-08-16

## 2021-08-16 DIAGNOSIS — I10 HYPERTENSION, UNSPECIFIED TYPE: ICD-10-CM

## 2021-08-16 DIAGNOSIS — M47.816 LUMBAR SPONDYLOSIS: ICD-10-CM

## 2021-08-16 PROCEDURE — 99305 1ST NF CARE MODERATE MDM 35: CPT | Performed by: INTERNAL MEDICINE

## 2021-08-17 ENCOUNTER — SNF ADMIT/H&P (OUTPATIENT)
Dept: INTERNAL MEDICINE CLINIC | Facility: SKILLED NURSING FACILITY | Age: 71
End: 2021-08-17

## 2021-08-17 DIAGNOSIS — Z09 FOLLOW UP: ICD-10-CM

## 2021-08-17 DIAGNOSIS — R03.0 ELEVATED BLOOD PRESSURE READING: ICD-10-CM

## 2021-08-17 DIAGNOSIS — R19.5 LOOSE STOOLS: ICD-10-CM

## 2021-08-17 DIAGNOSIS — Z51.89 ENCOUNTER FOR MEDICATION ADJUSTMENT: ICD-10-CM

## 2021-08-17 DIAGNOSIS — Z79.899 ENCOUNTER FOR MEDICATION REVIEW: ICD-10-CM

## 2021-08-17 DIAGNOSIS — Z48.89 ENCOUNTER FOR POST SURGICAL WOUND CHECK: ICD-10-CM

## 2021-08-17 PROCEDURE — 99310 SBSQ NF CARE HIGH MDM 45: CPT | Performed by: NURSE PRACTITIONER

## 2021-08-17 NOTE — PROGRESS NOTES
Fei Masters  : 1950  Age 70year old  male patient is admitted to [facility] for DORIS.     Reason For Visit: Initial APRN Assessment/Follow up S/P L5/S1 transforaminal interbody fusion right side lumbar 5-Sacrum 1 with ENDOSCOPY   • COLONOSCOPY  01/2019    GSAM- rectal mass; polyps- repeat colon 1 yr   • COLOSTOMY      reversed   • HC TRANSLUMINAL ANGIOPLASTY W STENT ILIAC EA ADDL      iliac stent   • OTHER      right iliac artery stent   • OTHER      right eye enucleati cyanosis, 2+ pedal pulses. MUSCULOSKELETAL: No acute synovitis in bilateral upper and lower extremities.   Back:slightly tender, S/P L5/S1 TLIF  Skin/Wound: Warm and moist. Lower back surgical incision with steri strips,dressing CDI and no signs of infecti

## 2021-08-17 NOTE — PAYOR COMM NOTE
--------------  DISCHARGE REVIEW    Payor: [address] #:  [id number]  Authorization Number: [id number]    Admit date: 8/9/21  Admit time:  11:27 AM  Discharge Date: 8/13/2021  2:31 PM     Admitting Physician: Leena Segura MD TABLET BY MOUTH EVERY DAY     metoprolol succinate 25 MG Tb24  Commonly known as: Toprol XL  Take 1 tablet (25 mg total) by mouth daily. Where to Get Your Medications      These medications were sent to [facility] [place] -  [facility]  Freddie TRANSFORAMINAL INTERBODY FUSION RIGHT SIDE LUMBAR 5 - SACRUM 1 WITH ILIAC BONE CREST GRAFT (Right)     Day of discharge feels well, no complaints no Cp or SOB.     Exam   08/13/21  1030   BP:    Pulse: 84   Resp: 16   Temp: 98.5 °F (36.9 °C)   No acute dist

## 2021-08-17 NOTE — PAYOR COMM NOTE
--------------  CONTINUED STAY REVIEW    Payor: [address] #:  [id number]  Authorization Number: [id number]    Admit date: 8/9/21  Admit time: 11:27 AM    Admitting Physician: Bk Gonzalez MD  Attending Physician:  Amy att.  virgil recent history of spine surgery and risk for formation of epidural hematoma. May resume chemical prophylaxis if needed 5 days post-operative. -OK to DC from [facility].  Morenita Berrios MD  8/12/2021 12:43 PM      Hospitalist Progress Note [id number]          0854-Given   1631-D/C'd        Senna (SENOKOT) tab 17.2 mg   Dose: 17.2 mg  Freq: Nightly Route: OR  Start: 08/10/21 0145 End: 08/13/21 1631    2117-Given          2012-Given          1631-D/C'd            acetaminophen (TYLENOL) tab 65

## 2021-08-20 ENCOUNTER — EXTERNAL FACILITY (OUTPATIENT)
Dept: INTERNAL MEDICINE CLINIC | Facility: CLINIC | Age: 71
End: 2021-08-20

## 2021-08-20 DIAGNOSIS — M47.816 LUMBAR SPONDYLOSIS: ICD-10-CM

## 2021-08-20 DIAGNOSIS — I73.9 PAD (PERIPHERAL ARTERY DISEASE) (HCC): ICD-10-CM

## 2021-08-20 DIAGNOSIS — I10 HYPERTENSION, UNSPECIFIED TYPE: ICD-10-CM

## 2021-08-20 PROCEDURE — 99308 SBSQ NF CARE LOW MDM 20: CPT | Performed by: INTERNAL MEDICINE

## 2021-08-21 NOTE — PROGRESS NOTES
Location [facility]  Type in person  Date of service August 20, 2021    Patient seen and examined. He reports that he is progressing well. He is able to move around. He wants to go home.   Physical therapy recommended for him to stay few mo

## 2021-08-21 NOTE — PROGRESS NOTES
Location [facility]  Type in person  Date of service August 16, 2021    Admission note    77-year-old gentleman with a past medical history of rectal adenocarcinoma, hypertension, dyslipidemia, history of DVT was admitted to the hospital afte and dry    Assessment and plan    Lumbar spondylosis status post fusion-continue with local wound care. Start physical therapy for optimizing transfer and gait and mobility-continue gabapentin    Hypertension-blood pressure is running high.   Most likely f

## 2021-08-31 NOTE — OPERATIVE REPORT
Operative Note    DOS: 8/9/2021  Patient Name: Jumana Darby   Preoperative Diagnosis:   1.) Lumbar Spondylosis  2.) Lumbar Foraminal Stenosis with Radiculopathy  Postoperative Diagnosis: Same  Primary Surgeon: MD Rayna Hough with indelible ink. The patient was brought back to the operating room. The patient was administered prophylactic antibiotics per SCIP guidelines.   After successful induction of general endotracheal anesthesia, a amezquita catheter was placed and sequential c pars and longitudinally at the spinous process/laminar junction with a high speed jakob and osteotomes.  Using a Kerrison rongeur, a partial hemilaminotomy was performed from the midline at the cephalad aspect at S1 out lateral to the medial aspect of the pe placed. AP and lateral radiographs were obtained confirming the  placement of the pedicle screws and rods. Set caps were tightened to the screw 's predetermined specification utilizing a torque . This was repeated for all pedicle screws.

## 2021-12-24 ENCOUNTER — LAB ENCOUNTER (OUTPATIENT)
Dept: LAB | Age: 71
End: 2021-12-24
Attending: INTERNAL MEDICINE
Payer: MEDICARE

## 2021-12-24 DIAGNOSIS — Z85.038 PERSONAL HISTORY OF COLON CANCER: ICD-10-CM

## 2021-12-27 ENCOUNTER — ANESTHESIA EVENT (OUTPATIENT)
Dept: ENDOSCOPY | Facility: HOSPITAL | Age: 71
End: 2021-12-27
Payer: MEDICARE

## 2021-12-27 ENCOUNTER — ANESTHESIA (OUTPATIENT)
Dept: ENDOSCOPY | Facility: HOSPITAL | Age: 71
End: 2021-12-27
Payer: MEDICARE

## 2021-12-27 ENCOUNTER — HOSPITAL ENCOUNTER (OUTPATIENT)
Facility: HOSPITAL | Age: 71
Setting detail: HOSPITAL OUTPATIENT SURGERY
Discharge: HOME | End: 2021-12-27
Attending: INTERNAL MEDICINE | Admitting: INTERNAL MEDICINE
Payer: MEDICARE

## 2021-12-27 VITALS
BODY MASS INDEX: 27.83 KG/M2 | HEIGHT: 73 IN | OXYGEN SATURATION: 100 % | SYSTOLIC BLOOD PRESSURE: 118 MMHG | HEART RATE: 68 BPM | DIASTOLIC BLOOD PRESSURE: 60 MMHG | RESPIRATION RATE: 16 BRPM | WEIGHT: 210 LBS | TEMPERATURE: 98 F

## 2021-12-27 DIAGNOSIS — D50.9 IRON DEFICIENCY ANEMIA, UNSPECIFIED IRON DEFICIENCY ANEMIA TYPE: ICD-10-CM

## 2021-12-27 DIAGNOSIS — Z85.038 PERSONAL HISTORY OF COLON CANCER: Primary | ICD-10-CM

## 2021-12-27 DIAGNOSIS — Z86.010 PERSONAL HISTORY OF COLONIC POLYPS: ICD-10-CM

## 2021-12-27 PROCEDURE — 0DB68ZX EXCISION OF STOMACH, VIA NATURAL OR ARTIFICIAL OPENING ENDOSCOPIC, DIAGNOSTIC: ICD-10-PCS | Performed by: INTERNAL MEDICINE

## 2021-12-27 PROCEDURE — 0DBL8ZX EXCISION OF TRANSVERSE COLON, VIA NATURAL OR ARTIFICIAL OPENING ENDOSCOPIC, DIAGNOSTIC: ICD-10-PCS | Performed by: INTERNAL MEDICINE

## 2021-12-27 PROCEDURE — 0DBP8ZX EXCISION OF RECTUM, VIA NATURAL OR ARTIFICIAL OPENING ENDOSCOPIC, DIAGNOSTIC: ICD-10-PCS | Performed by: INTERNAL MEDICINE

## 2021-12-27 PROCEDURE — 0DBM8ZX EXCISION OF DESCENDING COLON, VIA NATURAL OR ARTIFICIAL OPENING ENDOSCOPIC, DIAGNOSTIC: ICD-10-PCS | Performed by: INTERNAL MEDICINE

## 2021-12-27 PROCEDURE — 88305 TISSUE EXAM BY PATHOLOGIST: CPT | Performed by: INTERNAL MEDICINE

## 2021-12-27 PROCEDURE — 0DB98ZX EXCISION OF DUODENUM, VIA NATURAL OR ARTIFICIAL OPENING ENDOSCOPIC, DIAGNOSTIC: ICD-10-PCS | Performed by: INTERNAL MEDICINE

## 2021-12-27 RX ORDER — LIDOCAINE HYDROCHLORIDE 10 MG/ML
INJECTION, SOLUTION EPIDURAL; INFILTRATION; INTRACAUDAL; PERINEURAL AS NEEDED
Status: DISCONTINUED | OUTPATIENT
Start: 2021-12-27 | End: 2021-12-27 | Stop reason: SURG

## 2021-12-27 RX ORDER — SODIUM CHLORIDE, SODIUM LACTATE, POTASSIUM CHLORIDE, CALCIUM CHLORIDE 600; 310; 30; 20 MG/100ML; MG/100ML; MG/100ML; MG/100ML
INJECTION, SOLUTION INTRAVENOUS CONTINUOUS
Status: DISCONTINUED | OUTPATIENT
Start: 2021-12-27 | End: 2021-12-27

## 2021-12-27 RX ADMIN — SODIUM CHLORIDE, SODIUM LACTATE, POTASSIUM CHLORIDE, CALCIUM CHLORIDE: 600; 310; 30; 20 INJECTION, SOLUTION INTRAVENOUS at 08:54:00

## 2021-12-27 RX ADMIN — LIDOCAINE HYDROCHLORIDE 25 MG: 10 INJECTION, SOLUTION EPIDURAL; INFILTRATION; INTRACAUDAL; PERINEURAL at 08:13:00

## 2021-12-27 RX ADMIN — SODIUM CHLORIDE, SODIUM LACTATE, POTASSIUM CHLORIDE, CALCIUM CHLORIDE: 600; 310; 30; 20 INJECTION, SOLUTION INTRAVENOUS at 08:15:00

## 2021-12-27 NOTE — ANESTHESIA POSTPROCEDURE EVALUATION
[address] Patient Status:  Hospital Outpatient Surgery   Age/Gender 70year old male MRN [id number]   Location [id number] [place] [address] Attending Padmini Roger MD   Hosp Day # 0 PCP Marilu Siddiqui MD

## 2021-12-27 NOTE — H&P
History & Physical Examination    Patient Name: Chele Edmonds  MRN: [id number]  CSN: [id number]  YOB: 1950    Diagnosis: Personal history of colonic polyps [Z86.010]  Personal history of colon cancer [Z85.038]  Iron deficiency anemia, COLONOSCOPY  01/2019    GSAM- rectal mass; polyps- repeat colon 1 yr   • COLOSTOMY      reversed   • HC TRANSLUMINAL ANGIOPLASTY W STENT ILIAC EA ADDL      iliac stent   • OTHER      right iliac artery stent   • OTHER      right eye enucleation secondary t

## 2021-12-27 NOTE — OPERATIVE REPORT
OPERATIVE REPORT   PATIENT NAME: Christian Jha  MRN: [id number]  DATE OF OPERATION: 12/27/2021  PREOPERATIVE DIAGNOSIS: history of rectal cancer s/p LAR; redo surgery required because cancer was distal to the surgery.   Repeat surgery performed- all normal without stricture or esophagitis. No hiatal hernia or Allen's esophagus was noted. Stomach was normal in the antrum and the body except for mild antral gastritis. .  Duodenum was normal in the bulb and 2nd duodenum except for a 5 mm nodule in the 1. Ulcerated mass in the rectum highly suspicious for neoplasm. 2. Several other polyps were removed. RECOMMENDATIONS: We will check biopsies and further recommendations will be made at that time.   DISCHARGE: The patient was given an after visit summar

## 2021-12-27 NOTE — ANESTHESIA PREPROCEDURE EVALUATION
PRE-OP EVALUATION    Patient Name: Jumana Darby    Admit Diagnosis: Personal history of colonic polyps [Z86.010]  Personal history of colon cancer [Z85.038]  Iron deficiency anemia, unspecified iron deficiency anemia type [D50.9]    Pre-op Diagno summary reviewed.     Anesthetic Complications           GI/Hepatic/Renal      (+) GERD                           Cardiovascular                  (+) hypertension     (+) CAD                                Endo/Other                                  Pulmona

## 2022-01-04 NOTE — PROGRESS NOTES
On 2nd thought, due to the large transverse colon polyp that was removed in piecemeal fashion, I am changing my recommendation for repeat colonoscopy from 3 years to 1 year

## 2022-01-04 NOTE — PROGRESS NOTES
Your EGD showed a duodenal nodule    Your colonoscopy showed polyps and mass like lesion in the rectum    Here are the biopsy/pathology findings from your recent EGD (upper endoscopy) and colonoscopy:     The biopsy/pathology findings from your upper endosc

## 2024-01-18 ENCOUNTER — OFFICE VISIT (OUTPATIENT)
Facility: CLINIC | Age: 74
End: 2024-01-18
Payer: MEDICARE

## 2024-01-18 VITALS — BODY MASS INDEX: 28.76 KG/M2 | WEIGHT: 217 LBS | RESPIRATION RATE: 16 BRPM | HEIGHT: 73 IN

## 2024-01-18 DIAGNOSIS — I73.9 PAD (PERIPHERAL ARTERY DISEASE) (HCC): Primary | ICD-10-CM

## 2024-01-18 DIAGNOSIS — M48.062 NEUROGENIC CLAUDICATION DUE TO LUMBAR SPINAL STENOSIS: ICD-10-CM

## 2024-01-18 NOTE — PROGRESS NOTES
Samer F. Najjar, MD  Vascular Surgery  [facility]      VASCULAR SURGERY   CLINIC CONSULT NOTE        Name: Madelaine Galaviz   :   1950  [id number]     REFERRING PHYSICIAN:  No ref. provider found  PRIMARY CARE PHYSICIAN:  Deejay Berumen MD    HISTORY OF PRESENT ILLNESS:   Patient is a 73 year old male who has been referred regarding    Patient is a 70 year old male who is here for evaluation of his bilateral lower extremity hip and calf pain when walking for about a block.  He has known peripheral arterial disease as well as neurogenic claudication.  The patient is known to me from about 2 years ago when he underwent an emergent angioplasty and stenting of his right superficial femoral artery  with percutaneous thrombectomy of his tibial vessels with excellent outcome in 2020. This was done for a nonhealing ulcer on his right fifth toe. This went on to heal.  The patient also has a right common iliac artery stent placed back in  at [facility]. The patient has finally stopped smoking.  He was also noted to have significant neurogenic symptoms and was seen by the spine physician who performed now for almost 2 months.  He does have a history of spine disease and has seen a spine surgeon in the past.  He underwent a minimally invasive posterior TLIF of the lumbar and spine areas.  The patient states that he has recurrent symptoms with pain at the hips and down to the calfs with neuropathy involving the right foot.  He has not had any arterial testing since then.  His spine surgeon left the practice and he is interested in establishing care with another spine surgeon.  He also needs a new podiatrist.      PAST MEDICAL HISTORY:    Past Medical History:   Diagnosis Date    Atherosclerosis of aorta (HCC)     Carcinoma in situ of rectosigmoid junction 2019 OV note from Surgery    Carcinoma of rectosigmoid (colon) (HCC) 10/19/2018    Carotid  occlusion, bilateral     Colon cancer (HCC)     Deep vein thrombosis (HCC)     right    Exposure to medical diagnostic radiation 2019    GERD (gastroesophageal reflux disease)     Heart attack (HCC)     unsure when    High blood pressure     High cholesterol     MRSA (methicillin resistant Staphylococcus aureus)     pt not aware where located-states it was \"early 2020\" at [facility]    Neuropathy     right leg    Osteoarthritis     PAD (peripheral artery disease) (HCC)     Personal history of antineoplastic chemotherapy 2019    Tobacco abuse     Visual impairment     right eye prosthesis       PAST SURGICAL HISTORY:   Past Surgical History:   Procedure Laterality Date    COLONOSCOPY N/A 8/7/2018    Procedure: COLONOSCOPY;  Surgeon: Robson Higgins MD;  Location: [facility] ENDOSCOPY    COLONOSCOPY  01/2019    GSAM- rectal mass; polyps- repeat colon 1 yr    COLONOSCOPY N/A 12/27/2021    large adenoma; rectal mass is benign inflammatory - repeat 1 year    COLOSTOMY      reversed    HC TRANSLUMINAL ANGIOPLASTY W STENT ILIAC EA ADDL      iliac stent    OTHER      right iliac artery stent    OTHER      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 , Disp: 30 tablet, Rfl: PRN    GABAPENTIN 100 MG Oral Cap, TAKE 1 CAPSULE (100 MG TOTAL) BY MOUTH 3 (THREE) TIMES DAILY., Disp: 90 capsule, Rfl: PRN    tiZANidine 4 MG Oral Tab, Take 1 tablet (4 mg total) by  mouth 3 (three) times daily., Disp: 60 tablet, Rfl: 0    HYDROcodone-acetaminophen 5-325 MG Oral Tab, Take 1 tablet by mouth every 4 (four) hours as needed for Pain., Disp: 10 tablet, Rfl: 0    ALLERGIES:    He has No Known Allergies.    SOCIAL HISTORY:    Patient  reports that he quit smoking about 2 years ago. His smoking use included cigarettes. He has a 16.5 pack-year smoking history. He has never used smokeless tobacco. He reports that he does not currently use alcohol. He reports that he does not use drugs.    FAMILY HISTORY:    Patient's family history includes Dementia in his mother; cad in his father; lung cancer in his brother; parkinsons in his mother.    ROS:     A 12 point review of systems with pertinent positives and negatives listed in the HPI.    EXAM:    Resp 16   Ht 6' 1\" (1.854 m)   Wt 217 lb (98.4 kg)   BMI 28.63 kg/m²   GENERAL: alert and orientated X 3, well developed, well nourished, in no apparent distress  PSYCH: normal mood and affect  HEENT: ears and throat are clear  NECK: supple, no lymphadenopathy, thyroid wnl  CAROTID: no bruit   RESPIRATORY: no rales, rhonchi, or wheezes B  CARDIO: RRR without murmur, no murmur, no gallop   ABDOMEN: soft, non-tender with no palpable aneurysm or masses  BACK: normal, no tenderness  SKIN: no rashes, warm and dry  EXTREMITIES: no tenderness  NEURO: no sensory or motor deficits  VASCULAR:      Femoral Popliteal DP PT Peroneal   Right 2+       monophasic signal monophasic signal    Left 2+       monophasic signal palpable, strong        LABS:   Lab Results   Component Value Date     06/02/2020    A1C 6.0 (H) 06/02/2020      Lab Results   Component Value Date     (H) 08/12/2021    BUN 12 08/12/2021    CREATSERUM 0.89 08/12/2021    BUNCREA 13.5 08/12/2021    ANIONGAP 4 08/12/2021    GFRAA 99 08/12/2021    GFRNAA 86 08/12/2021    CA 8.7 08/12/2021     08/12/2021    K 3.7 08/12/2021     08/12/2021    CO2 28.0 08/12/2021     OSMOCALC 283 08/12/2021      Lab Results   Component Value Date    WBC 6.37 07/23/2021    RBC 4.88 07/23/2021    HGB 13.7 08/10/2021    HCT 41.5 08/10/2021    MCV 88.1 07/23/2021    MCH 28.3 07/23/2021    MCHC 32.1 07/23/2021    RDW 19.4 (H) 07/23/2021     07/23/2021        Lab Results   Component Value Date    HGB 13.7 08/10/2021    HGB 13.8 07/23/2021    HGB 9.9 (L) 05/26/2021    HGB 9.3 (L) 11/12/2020    HGB 9.6 (L) 11/05/2020    HGB 12.1 (L) 10/24/2020    CREATSERUM 0.89 08/12/2021    CREATSERUM 1.12 08/11/2021    CREATSERUM 1.17 08/10/2021    CREATSERUM 1.16 07/23/2021    CREATSERUM 1.16 05/25/2021    CREATSERUM 1.16 10/24/2020       IMAGING:       ASSESSMENT  Diagnoses and all orders for this visit:    PAD (peripheral artery disease) ([facility])  -     US ARTERIAL DUPLEX LOWER EXTREMITY RIGHT (CPT=93926); Future  -     Podiatry Referral - In Network    Neurogenic claudication due to lumbar spinal stenosis  -     Neurosurgery Referral - Contracted Specialist - Janeen       The patient has bilateral hip and calf pain that I believe is secondary to a neurogenic or osteoarthritic etiology.  His symptoms are somewhat identical in both legs even though he has a very strongly palpable left posterior tibial pulse.  Given that he required a thrombectomy on his right lower extremity as he did have compromise of perfusion to his foot at that time.  He would benefit from monitoring of his right lower extremity stent.  I have ordered a right lower extremity arterial duplex to evaluate the stent in the tibial arteries.  I have referred him to a spine physician as well as to a podiatrist to have his nails trimmed.    PLAN:  As above    The patient indicated an understanding of these issues and agreed to the plan and all questions were answered during the clinic visit.      Thank you for allowing me to participate in your patient's care.   Please do not hesitate to contact me with any questions.    Sincerely,  Samer F. Najjar,  MD    Please note: Dragon speech recognition software was used to prepare this note. If a word or phrase is confusing, it is likely do to a failure of recognition.   Please contact me with any questions or clarifications.

## 2024-02-01 ENCOUNTER — HOSPITAL ENCOUNTER (OUTPATIENT)
Dept: ULTRASOUND IMAGING | Facility: HOSPITAL | Age: 74
Discharge: HOME | End: 2024-02-01
Attending: SURGERY
Payer: MEDICARE

## 2024-02-01 DIAGNOSIS — I73.9 PAD (PERIPHERAL ARTERY DISEASE) (HCC): ICD-10-CM

## 2024-02-01 PROCEDURE — 93926 LOWER EXTREMITY STUDY: CPT | Performed by: SURGERY

## 2024-02-12 ENCOUNTER — OFFICE VISIT (OUTPATIENT)
Facility: CLINIC | Age: 74
End: 2024-02-12
Payer: MEDICARE

## 2024-02-12 VITALS — BODY MASS INDEX: 28.76 KG/M2 | HEIGHT: 73 IN | WEIGHT: 217 LBS | RESPIRATION RATE: 18 BRPM

## 2024-02-12 DIAGNOSIS — I73.9 PAD (PERIPHERAL ARTERY DISEASE) (HCC): ICD-10-CM

## 2024-02-12 DIAGNOSIS — M48.062 NEUROGENIC CLAUDICATION DUE TO LUMBAR SPINAL STENOSIS: Primary | ICD-10-CM

## 2024-02-12 NOTE — PROGRESS NOTES
Samer F. Najjar, MD  Vascular Surgery  [facility]       VASCULAR SURGERY OFFICE NOTE        Name: Madelaine Galaviz   : 1950  [id number]     REASON FOR VISIT:   Patient is a 73 year old male who is here to discuss the results of his recent arterial flow testing.  Right lower extremity duplex.  This revealed occlusion of his superficial femoral artery.  The patient continues to have bilateral hip and calf pain when walking short distances.  He was noted to have a palpable left posterior tibial pulse.  He has not made an appointment yet with his spine surgeon.    PAST MEDICAL HISTORY:     Past Medical History:   Diagnosis Date    Atherosclerosis of aorta (HCC)     Carcinoma in situ of rectosigmoid junction 2019 OV note from Surgery    Carcinoma of rectosigmoid (colon) (HCC) 10/19/2018    Carotid occlusion, bilateral     Colon cancer (HCC)     Deep vein thrombosis (HCC)     right    Exposure to medical diagnostic radiation     GERD (gastroesophageal reflux disease)     Heart attack (HCC)     unsure when    High blood pressure     High cholesterol     MRSA (methicillin resistant Staphylococcus aureus)     pt not aware where located-states it was \"early 2020\" at [facility]    Neuropathy     right leg    Osteoarthritis     PAD (peripheral artery disease) (HCC)     Personal history of antineoplastic chemotherapy 2019    Tobacco abuse     Visual impairment     right eye prosthesis       PAST SURGICAL HISTORY:     Past Surgical History:   Procedure Laterality Date    COLONOSCOPY N/A 2018    Procedure: COLONOSCOPY;  Surgeon: Robson Higgins MD;  Location: [facility] ENDOSCOPY    COLONOSCOPY  2019    GSAM- rectal mass; polyps- repeat colon 1 yr    COLONOSCOPY N/A 2021    large adenoma; rectal mass is benign inflammatory - repeat 1 year    COLOSTOMY      reversed    HC TRANSLUMINAL ANGIOPLASTY W STENT ILIAC EA ADDL      iliac stent    OTHER      right iliac  artery stent    OTHER      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 , Disp: 30 tablet, Rfl: PRN    GABAPENTIN 100 MG Oral Cap, TAKE 1 CAPSULE (100 MG TOTAL) BY MOUTH 3 (THREE) TIMES DAILY., Disp: 90 capsule, Rfl: PRN    tiZANidine 4 MG Oral Tab, Take 1 tablet (4 mg total) by mouth 3 (three) times daily., Disp: 60 tablet, Rfl: 0    HYDROcodone-acetaminophen 5-325 MG Oral Tab, Take 1 tablet by mouth every 4 (four) hours as needed for Pain., Disp: 10 tablet, Rfl: 0    LABSS:     Lab Results   Component Value Date     06/02/2020    A1C 6.0 (H) 06/02/2020      Lab Results   Component Value Date     (H) 08/12/2021    BUN 12 08/12/2021    CREATSERUM 0.89 08/12/2021    BUNCREA 13.5 08/12/2021    ANIONGAP 4 08/12/2021    GFRAA 99 08/12/2021    GFRNAA 86 08/12/2021    CA 8.7 08/12/2021     08/12/2021    K 3.7 08/12/2021     08/12/2021    CO2 28.0 08/12/2021    OSMOCALC 283 08/12/2021      Lab Results   Component Value Date    WBC 6.37 07/23/2021    RBC 4.88 07/23/2021    HGB 13.7 08/10/2021    HCT 41.5 08/10/2021    MCV 88.1 07/23/2021    MCH 28.3 07/23/2021    MCHC 32.1 07/23/2021    RDW 19.4 (H) 07/23/2021     07/23/2021        Lab Results   Component Value Date    HGB 13.7 08/10/2021    HGB 13.8 07/23/2021    HGB 9.9 (L) 05/26/2021    HGB 9.3 (L) 11/12/2020    HGB 9.6 (L) 11/05/2020    HGB 12.1 (L) 10/24/2020    NAOMY  0.89 08/12/2021    CREATSERUM 1.12 08/11/2021    CREATSERUM 1.17 08/10/2021    CREATSERUM 1.16 07/23/2021    CREATSERUM 1.16 05/25/2021    CREATSERUM 1.16 10/24/2020       EXAM:   Resp 18   Ht 6' 1\" (1.854 m)   Wt 217 lb (98.4 kg)   BMI 28.63 kg/m²     Palpable left posterior tibial pulse.  The right foot has no ulcerations     ASSESSMENT    Diagnoses and all orders for this visit:    Neurogenic claudication due to lumbar spinal stenosis    PAD (peripheral artery disease) ([facility])      I had a long discussion with the patient where we discussed the importance of him seeing the spine surgeon first before we would even consider treatment of his right lower extremity.  I believe his short distance pain affecting both of his legs is due to his spine disease and there is no benefit for him to undergo treatment of his right lower extremity without history of mine.  He has symptoms that are equal on both sides and performing a revascularization on the right leg will not improve that.    PLAN:     I recommended that the patient follow-up with his spine surgeon for treatment and then he can see me if he continues to have claudication symptoms.        Samer F. Najjar MD  Division of Vascular Surgery

## 2024-02-13 ENCOUNTER — OFFICE VISIT (OUTPATIENT)
Dept: SURGERY | Facility: CLINIC | Age: 74
End: 2024-02-13
Payer: MEDICARE

## 2024-02-13 VITALS
HEIGHT: 73 IN | SYSTOLIC BLOOD PRESSURE: 175 MMHG | WEIGHT: 211 LBS | HEART RATE: 66 BPM | DIASTOLIC BLOOD PRESSURE: 72 MMHG | BODY MASS INDEX: 27.96 KG/M2

## 2024-02-13 DIAGNOSIS — Z98.1 STATUS POST LUMBAR SPINAL FUSION: ICD-10-CM

## 2024-02-13 DIAGNOSIS — M54.41 CHRONIC BILATERAL LOW BACK PAIN WITH BILATERAL SCIATICA: Primary | ICD-10-CM

## 2024-02-13 DIAGNOSIS — M54.42 CHRONIC BILATERAL LOW BACK PAIN WITH BILATERAL SCIATICA: Primary | ICD-10-CM

## 2024-02-13 DIAGNOSIS — G89.29 CHRONIC BILATERAL LOW BACK PAIN WITH BILATERAL SCIATICA: Primary | ICD-10-CM

## 2024-02-13 PROCEDURE — 99204 OFFICE O/P NEW MOD 45 MIN: CPT

## 2024-02-13 NOTE — PROGRESS NOTES
[facility]        Center for [facility]      [address]  [address]  [place] [address]    PHONE  [phone]          FAX  [phone]    [email or web address]      OFFICE CONSULTATION          Madelaine Galaviz  : 1950   MRN: [id number]    PCP: Deejay Berumen MD  Referring Provider: No ref. provider found     Insurance: Payor: MEDICARE FFS / Plan: [employer] MMAI / Product Type: Medicare /            Date of Consult:  2024    Reason for Consultation:  Our patient has been referred to our office for evaluation of:  Low back and bilateral lower extremity radiating pain    History of Present Illness:  Madelaine Galaviz is a a(n) 73 year old, male with past medical history of L4-5 posterior interbody fusion in , who presents to the office due to lower extremity pain.  Patient was seen by his vascular surgeon for peripheral arterial disease but was referred to us to rule out spinal stenosis.  Patient reports bilateral lower extremity pain which originates in the gluteus and radiates into the calfs with ambulation.  When patient sits the pain improves.  Patient has a long history of low back pain.  He endorses 10 out of 10 pain in the lower extremities at times throughout the day.  He has had loss of functional ability due to this pain.  Patient is not currently utilizing any pain medications for his discomfort.  Patient denies changes in his bowel or bladder or lower extremity weakness.  He does endorse some right foot numbness.    History:  Past Medical History:   Diagnosis Date    Atherosclerosis of aorta (HCC)     Carcinoma in situ of rectosigmoid junction 2019 OV note from Surgery    Carcinoma of rectosigmoid (colon) (HCC) 10/19/2018    Carotid occlusion, bilateral     Colon cancer (HCC)     Deep vein thrombosis (HCC)     right    Exposure to medical diagnostic radiation      GERD (gastroesophageal reflux disease)     Heart attack (HCC)     unsure when    High blood pressure     High cholesterol     MRSA (methicillin resistant Staphylococcus aureus)     pt not aware where located-states it was \"early 2020\" at [facility]    Neuropathy     right leg    Osteoarthritis     PAD (peripheral artery disease) (Formerly [facility])     Personal history of antineoplastic chemotherapy     Tobacco abuse     Visual impairment     right eye prosthesis      Past Surgical History:   Procedure Laterality Date    COLONOSCOPY N/A 2018    Procedure: COLONOSCOPY;  Surgeon: Robson Higgins MD;  Location: [facility] ENDOSCOPY    COLONOSCOPY  2019    GSAM- rectal mass; polyps- repeat colon 1 yr    COLONOSCOPY N/A 2021    large adenoma; rectal mass is benign inflammatory - repeat 1 year    COLOSTOMY      reversed    HC TRANSLUMINAL ANGIOPLASTY W STENT ILIAC EA ADDL      iliac stent    OTHER      right iliac artery stent    OTHER      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    PART REMOVAL COLON W END COLOSTOMY      SPINE SURGERY PROCEDURE UNLISTED  2021    lumbar     Family History   Problem Relation Age of Onset    Dementia Mother     Other (parkinsons) Mother     Other (cad) Father     Other (lung cancer) Brother       Social History     Socioeconomic History    Marital status: Legally      Spouse name: Not on file    Number of children: Not on file    Years of education: Not on file    Highest education level: Not on file   Occupational History    Not on file   Tobacco Use    Smoking status: Former     Packs/day: 0.33     Years: 50.00     Additional pack years: 0.00     Total pack years: 16.50     Types: Cigarettes     Quit date: 2021     Years since quittin.6    Smokeless tobacco: Never   Vaping Use    Vaping Use: Never used   Substance and Sexual Activity    Alcohol use: Not Currently    Drug use: No    Sexual activity: Not on file   Other Topics Concern    Not on file    Social History Narrative    Not on file     Social Determinants of Health     Financial Resource Strain: Not on file   Food Insecurity: Not on file   Transportation Needs: Not on file   Physical Activity: Not on file   Stress: Not on file   Social Connections: Not on file   Housing Stability: Not on file        Allergies:  No Known Allergies    Medications:  Current Outpatient Medications   Medication Sig Dispense Refill    diphenoxylate-atropine 2.5-0.025 MG Oral Tab Take 1 tablet by mouth 4 (four) times daily as needed for Diarrhea. 180 tablet 0    METOPROLOL SUCCINATE 50 MG Oral Tablet 24 Hr TAKE 1 TABLET (50 MG TOTAL) BY MOUTH DAILY. 90 tablet 3    LISINOPRIL 20 MG Oral Tab TAKE 1 TABLET BY MOUTH EVERY DAY 90 tablet 0    GABAPENTIN 100 MG Oral Cap TAKE 1 CAPSULE (100 MG TOTAL) BY MOUTH 3 (THREE) TIMES DAILY. 90 capsule PRN    tiZANidine 4 MG Oral Tab Take 1 tablet (4 mg total) by mouth 3 (three) times daily. 60 tablet 0    HYDROcodone-acetaminophen 5-325 MG Oral Tab Take 1 tablet by mouth every 4 (four) hours as needed for Pain. 10 tablet 0    ATORVASTATIN 80 MG Oral Tab TAKE 1 TABLET (80 MG TOTAL) BY MOUTH DAILY.  30 tablet PRN        Review of Systems:  A 10-point system was reviewed.  Pertinent positives and negatives are noted in HPI.      Physical Exam:  BP (!) 175/72 (BP Location: Right arm, Patient Position: Sitting, Cuff Size: adult)   Pulse 66   Ht 73\"   Wt 211 lb (95.7 kg)   BMI 27.84 kg/m²        Neurological Exam:    Motor Strength:  Gait is slow but within normal limits  5/5 AMG and symmetric    Sensation to light touch:  Intact and symmetric in bilateral lower extremities throughout  Right foot decree sensation    DTRs:  Absent in bilateral lower extremities patellar and Achilles and symmetric    Long tract signs:  Negative albarado  Negative babinski  Negative clonus      Abdomen:  Soft, non-distended, non-tender, with no rebound or guarding.  No peritoneal signs.     Extremities:   Non-tender, no lower extremity edema noted.      Labs:  CBC:  Lab Results   Component Value Date    WBC 6.37 07/23/2021    HGB 13.7 08/10/2021    HCT 41.5 08/10/2021    MCV 88.1 07/23/2021     07/23/2021      BMG:   Lab Results   Component Value Date     08/12/2021    K 3.7 08/12/2021    CO2 28.0 08/12/2021     08/12/2021    BUN 12 08/12/2021      INR:   Lab Results   Component Value Date    INR 0.9 07/23/2021    INR 1.01 10/20/2020    INR 1.0 10/08/2018    PROTIME 9.0 07/23/2021    PROTIME 9.4 10/08/2018        Diagnostics:  None to review     Diagnosis:  1. Chronic bilateral low back pain with bilateral sciatica  - MRI SPINE LUMBAR (CPT=72148); Future    2. Status post lumbar spinal fusion  - CT SPINE LUMBAR (CPT=72131); Future        Assessment/Plan:  Madelaine Galaviz is a a(n) 73 year old, male, who presents to the office for consultation.  Patient is presenting with symptoms consistent with neurogenic claudication.  He has a past history of lumbar interbody fusion in 2021 and has continued having low back pain and worsening radiating pain and tingling in his lower extremities.  I would like to order and MRI of his lumbar spine to evaluate for spinal canal stenosis and I would also like to order a CT scan of his lumbar spine to evaluate his previous L4-5 interbody fusion to rule out pseudoarthrosis in the setting of his chronic back pain.  I would like for patient to follow-up in the office after his imaging is completed and we will discuss next steps.    All questions and concerns were addressed. We appreciate the opportunity to participate in the care of this patient. Please do not hesitate to call our office ([phone]) with any issues.     This report will be submitted to the referring provider.    This note has been dictated utilizing voice recognition software. Unfortunately, this may lead to occasional typographical errors. If there are any questions regarding this, please do  not hesitate to contact our office.         Giancarlo Coyle PA-C    2/13/2024  4:37 PM

## 2024-03-12 ENCOUNTER — HOSPITAL ENCOUNTER (OUTPATIENT)
Dept: CT IMAGING | Facility: HOSPITAL | Age: 74
Discharge: HOME | End: 2024-03-12
Payer: MEDICARE

## 2024-03-12 DIAGNOSIS — Z98.1 STATUS POST LUMBAR SPINAL FUSION: ICD-10-CM

## 2024-03-12 PROCEDURE — 72131 CT LUMBAR SPINE W/O DYE: CPT

## 2024-04-23 ENCOUNTER — HOSPITAL ENCOUNTER (OUTPATIENT)
Dept: MRI IMAGING | Age: 74
Discharge: HOME | End: 2024-04-23
Payer: MEDICARE

## 2024-04-23 DIAGNOSIS — M54.41 CHRONIC BILATERAL LOW BACK PAIN WITH BILATERAL SCIATICA: ICD-10-CM

## 2024-04-23 DIAGNOSIS — M54.42 CHRONIC BILATERAL LOW BACK PAIN WITH BILATERAL SCIATICA: ICD-10-CM

## 2024-04-23 DIAGNOSIS — G89.29 CHRONIC BILATERAL LOW BACK PAIN WITH BILATERAL SCIATICA: ICD-10-CM

## 2024-04-23 PROCEDURE — 72148 MRI LUMBAR SPINE W/O DYE: CPT

## 2025-01-15 ENCOUNTER — TELEPHONE (OUTPATIENT)
Facility: CLINIC | Age: 75
End: 2025-01-15

## 2025-03-04 VITALS — WEIGHT: 205 LBS | BODY MASS INDEX: 27.17 KG/M2 | HEIGHT: 73 IN

## 2025-03-04 RX ORDER — CLOPIDOGREL BISULFATE 75 MG/1
75 TABLET ORAL DAILY
COMMUNITY

## 2025-03-04 NOTE — PAT NURSING NOTE
PAT call with patient. The following instructions were given and sent through his My Chart. Questions were answered and he verbalized understanding.    PreOp Instructions    You are scheduled for: a Peripheral Procedure    Date of Procedure: 03/12/25    Diet Instructions: Do not eat or drink anything after midnight including gum, mints, candy, etc.    Medications: Take Plavix 75mg the day of your procedure, Medications you are allowed to take can be taken with a sip of water the morning of your procedure    Medications to Stop: Hold herbal supplements and vitamins    Skin Prep : Shower with antibacterial soap using a clean washcloth, prior to procedure. Once dried off, no lotions/powders/creams/ointments, etc., Do not shave the procedure area, this will be completed at the hospital during the preparation phase.    Arrival Time: The day prior to your procedure you will receive a phone call before 6:00 pm with your arrival time. If you haven't received a phone call, please check your voicemail messages., If you did not receive a voice mail and it is after 6:00 pm, please call the nursing supervisor at [phone].    Driving After Procedure: Sedation will be given so you WILL NOT be able to drive home. You will need a responsible adult  to drive you home. You can NOT take uber or taxi unless approved by your physician in advance.    Discharge Teaching: Your nurse will give you specific instructions before discharge, Any questions, please call the physician's office       parking is available starting at 6 am or park in the [employer] parking garage at [facility]. Check in at the [facility] reception desk. Our  will be there to check you in for your procedure. Please bring your insurance cards and ID with you.

## 2025-03-12 ENCOUNTER — HOSPITAL ENCOUNTER (OUTPATIENT)
Dept: INTERVENTIONAL RADIOLOGY/VASCULAR | Facility: HOSPITAL | Age: 75
Discharge: HOME | End: 2025-03-12
Attending: SURGERY
Payer: MEDICARE

## 2025-03-14 RX ORDER — LOPERAMIDE HYDROCHLORIDE 2 MG/1
2 CAPSULE ORAL 2 TIMES DAILY
COMMUNITY

## 2025-03-14 NOTE — PAT NURSING NOTE
PreOp Instructions     You are scheduled for: a Peripheral Procedure     Date of Procedure: 03/20/25     Diet Instructions: Do not eat or drink anything after midnight including gum, mints, candy, etc the night before your procedure.     Medications: Take your Plavix (Clopidogrel) 75mg the morning of your procedure. Medications you are allowed to take can be taken with a sip of water the morning of your procedure. You can also take Imodium and Lisinopril.     Medications to Stop: Hold any herbal supplements and vitamins the morning of your procedure.     Skin Prep : Shower with antibacterial soap using a clean washcloth, prior to procedure. Once dried off, no lotions/powders/creams/ointments, etc., Do not shave the procedure area, this will be completed at the hospital during the preparation phase.     Arrival Time: The day prior to your procedure you will receive a phone call between 3:00 pm - 6:00 pm with your arrival time. If you haven't received a phone call, please check your voicemail messages., If you did not receive a voice mail and it is after 6:00 pm, please call the nursing supervisor at [phone].    Driving After Procedure: Sedation will be given so you WILL NOT be able to drive home. You will need a responsible adult  to drive you home. You can NOT take uber or taxi unless approved by your physician in advance.     Discharge Teaching: Your nurse will give you specific instructions before discharge, Most people can resume normal activities in 2-3 days, Any questions, please call the physician's office

## 2025-03-20 ENCOUNTER — HOSPITAL ENCOUNTER (OUTPATIENT)
Dept: INTERVENTIONAL RADIOLOGY/VASCULAR | Facility: HOSPITAL | Age: 75
Discharge: HOME | End: 2025-03-20
Attending: SURGERY | Admitting: SURGERY
Payer: MEDICARE

## 2025-03-20 VITALS
RESPIRATION RATE: 20 BRPM | HEART RATE: 83 BPM | TEMPERATURE: 98 F | DIASTOLIC BLOOD PRESSURE: 66 MMHG | SYSTOLIC BLOOD PRESSURE: 123 MMHG | OXYGEN SATURATION: 97 %

## 2025-03-20 DIAGNOSIS — I73.9 CLAUDICATION: ICD-10-CM

## 2025-03-20 LAB
ISTAT ACTIVATED CLOTTING TIME: 222 SECONDS (ref 74–137)
ISTAT ACTIVATED CLOTTING TIME: 250 SECONDS (ref 74–137)

## 2025-03-20 PROCEDURE — 36140 INTRO NDL ICATH UPR/LXTR ART: CPT | Performed by: SURGERY

## 2025-03-20 PROCEDURE — 99152 MOD SED SAME PHYS/QHP 5/>YRS: CPT | Performed by: SURGERY

## 2025-03-20 PROCEDURE — 99153 MOD SED SAME PHYS/QHP EA: CPT | Performed by: SURGERY

## 2025-03-20 PROCEDURE — 37226 HC TRANSLUM ANGIOPLASTY W STENT FEM POP UNILAT: CPT | Performed by: SURGERY

## 2025-03-20 PROCEDURE — 047K36Z DILATION OF RIGHT FEMORAL ARTERY WITH THREE DRUG-ELUTING INTRALUMINAL DEVICES, PERCUTANEOUS APPROACH: ICD-10-PCS | Performed by: SURGERY

## 2025-03-20 PROCEDURE — 85347 COAGULATION TIME ACTIVATED: CPT

## 2025-03-20 PROCEDURE — 047K3DZ DILATION OF RIGHT FEMORAL ARTERY WITH INTRALUMINAL DEVICE, PERCUTANEOUS APPROACH: ICD-10-PCS | Performed by: SURGERY

## 2025-03-20 PROCEDURE — 75710 ARTERY X-RAYS ARM/LEG: CPT | Performed by: SURGERY

## 2025-03-20 RX ORDER — HEPARIN SODIUM 5000 [USP'U]/ML
INJECTION, SOLUTION INTRAVENOUS; SUBCUTANEOUS
Status: COMPLETED
Start: 2025-03-20 | End: 2025-03-20

## 2025-03-20 RX ORDER — SODIUM CHLORIDE 9 MG/ML
INJECTION, SOLUTION INTRAVENOUS CONTINUOUS
Status: DISCONTINUED | OUTPATIENT
Start: 2025-03-20 | End: 2025-03-20

## 2025-03-20 RX ORDER — MIDAZOLAM HYDROCHLORIDE 1 MG/ML
INJECTION INTRAMUSCULAR; INTRAVENOUS
Status: COMPLETED
Start: 2025-03-20 | End: 2025-03-20

## 2025-03-20 RX ORDER — NITROGLYCERIN 20 MG/100ML
INJECTION INTRAVENOUS
Status: COMPLETED
Start: 2025-03-20 | End: 2025-03-20

## 2025-03-20 RX ORDER — IODIXANOL 320 MG/ML
100 INJECTION, SOLUTION INTRAVASCULAR
Status: COMPLETED | OUTPATIENT
Start: 2025-03-20 | End: 2025-03-20

## 2025-03-20 RX ORDER — LIDOCAINE HYDROCHLORIDE 10 MG/ML
INJECTION, SOLUTION EPIDURAL; INFILTRATION; INTRACAUDAL; PERINEURAL
Status: COMPLETED
Start: 2025-03-20 | End: 2025-03-20

## 2025-03-20 RX ORDER — CLOPIDOGREL BISULFATE 75 MG/1
TABLET ORAL
Status: COMPLETED
Start: 2025-03-20 | End: 2025-03-20

## 2025-03-20 RX ORDER — VERAPAMIL HYDROCHLORIDE 2.5 MG/ML
INJECTION, SOLUTION INTRAVENOUS
Status: COMPLETED
Start: 2025-03-20 | End: 2025-03-20

## 2025-03-20 RX ADMIN — IODIXANOL 135 ML: 320 INJECTION, SOLUTION INTRAVASCULAR at 13:22:00

## 2025-03-20 NOTE — OPERATIVE REPORT
Pre-Operative Diagnosis: 1.  Atherosclerosis with claudication right lower extremity 2.  Chronic stent occlusion right lower extremity  3. Aspirin allergy     Post-Operative Diagnosis: 1.  Atherosclerosis with claudication right lower extremity 2.  Chronic stent occlusion right lower extremity 3. Aspirin allergy     Procedure Performed:   1. US percutaneous access left common femoral artery   2. Selection of right common femoral artery and angiogram   3.  Ultrasound-guided percutaneous access right dorsalis pedis  4.  Crossing of chronic total occlusion of proximal superficial femoral artery and occluded mid SFA stent and subintimal plane parallel to stent  5.  Balloon angioplasty and stent of proximal superficial femoral artery to proximal popliteal artery with following: 7 x 140 Zilver, 7 x 140 Zilver, 7 x 80 Zilver, 7 x 60 bio mimics    Anesthesia: Moderate conscious sedation with 6 mg of Versed and 300 mcg of fentanyl start time 1110, finish 1313 for a total of 123 minutes    Assistant(s):        Surgical Findings:   1.  Right common femoral and profunda patent, superficial femoral artery occluded shortly after its origin to the level of the mid SFA stent-treated with angioplasty and stenting in a subintimal plane parallel to the previously occluded stent, popliteal artery patent, peroneal patent to the ankle, dominant runoff is the posterior tibial artery to the foot, dorsalis pedis is small but patent     Specimen: None     Estimated Blood Loss: 50 mL    Brief history: This is a 74-year-old male with previous interventions by 1 my previous partners.  He has disabling claudication all associated with a chronic total occlusion of his superficial femoral stent.  Will proceeding with intervention as described below.    Details of procedure: Patient was taken to the Cath Lab and prepped and draped in usual sterile fashion.  Moderate conscious sedation was initiated monitored by qualified nurse under my supervision.   Using ultrasound percutaneous access the left common femoral artery with a micropuncture kit.  I then advanced a Glidewire advantage into the aorta and then exchanged the micropuncture sheath for a 5 [place] sheath.  I then used a crossover catheter and help the aortic bifurcation and an angiogram of the right common iliac and external iliac.  There was a previously placed right common iliac stent.  External iliac was patent.  I then advanced a Glidewire advantage and crossover catheter to the level of the right common femoral artery and an angiogram of the right leg.  Right common femoral and profunda were patent.  Superficial femoral artery occluded shortly after its origin Clifford to the level of the mid superficial femoral artery stent.  Popliteal artery was patent.  Peroneal was patent to the ankle.  Posterior tibial artery was dominant runoff to the foot dorsalis pedis was small but patent.   I advanced the Glidewire advantage into the profunda as his aortic bifurcation was steep and I knew the stent would make passing the sheath difficult.  I then advanced a crossover catheter into the profunda and then exchanged the Glidewire advantage for a Whitaker wire.  I then exchanged the 5 [place] sheath for a 6 [place] by 45 cm destination sheath.  Sheath went up and over the aortic bifurcation and I advanced the sheath to the level of the right common femoral artery.  Patient was systemically heparinized.  I then used a Wills cross and exchanged the Whitaker wire for a Glidewire advantage and engaged the stump of the superficial femoral artery.  I was able to advance the Wills cross and Glidewire advantage to the level of the stent but found myself outside the stent and was unable to advance past the stent from the proximal approach.   The right foot was prepped and draped in usual fashion for pedal access.  Using ultrasound percutaneous access the dorsalis pedis artery and advanced the V18 wire into the popliteal artery.  I then  placed a 6 slender in the dorsalis pedis and then injected the radial artery cocktail through the SideArm of the 6 slender.  I then advanced a second 035 Wills cross and engaged the chronic total occlusion and immediately found myself outside the stent again but was able to advance the catheter and the wire to the level of the superficial femoral artery proximal to the stent in the second subintimal plane to the wire from the antegrade approach.  From the antegrade wire and up and over sheath I then advanced a 4 mm balloon into the superficial femoral artery and angioplasty of the proximal superficial femoral artery to create a subintimal plane and with manipulation of the catheter and the 018 advantage wire from below was able to get into the subintimal plane and then advanced into the true lumen of the common femoral artery above.  I then advanced the 035 Wills cross and then did a retrograde angiogram confirming I was intraluminal from my pedal access approach in the common femoral and external iliac.  I then placed a 035 Glidewire advantage into the aorta from the pedal access.  I then balloon angioplasty the chronic total occlusion of the superficial femoral artery and the stent crushing the occluded stent with 5 mm and 6 mm balloon inflations.  I then proceeded with stent placement starting proximally and profiling each stent as it was placed.  A 7 x 140 Zilver followed by a second 7 x 140 Zilver followed by a 7 x 80 Zilver and a 7 x 60 bio mimics stent were deployed and angioplastied in sequence with a 6 mm balloon.  At the level of stent I did a second set of inflations to completely exclude the chronically occluded stent and have full deployment of the new stents.  Repeat angiogram from the up and over sheath demonstrated wide patency of the right common femoral and profunda.  The superficial femoral artery was successfully recanalized and patent with patent stents from the ostium of the superficial femoral  artery to the proximal popliteal artery.  Mid popliteal and distal popliteal artery were patent.  Posterior tibial was the dominant runoff to the foot and patent to the plantar arch.  Peroneal artery was patent to the ankle and anterior tibial artery was patent.  All devices were removed and a Perclose device was placed in the left common femoral artery.  The right dorsalis pedis pedal access sheath was removed and pressure was held.  Patient was taken to recovery in stable condition.

## 2025-03-20 NOTE — PROGRESS NOTES
Pt post PV angio. Pt awake. Vss. Right femoral artery access site is CDI with femstop in place. Right pedal artery access site is CDI.     Recovery complete per protocol. Femstop removed. Pt has sat up, voided and walked. Right fem site remains soft, right pedal site remains soft, no signs of bleeding or hematoma. Discharge instructions reviewed, iv dc'd and pt discharged home with friend driving.

## 2025-03-20 NOTE — DISCHARGE INSTRUCTIONS
HOME CARE INSTRUCTIONS FOLLOWING CORONARY ANGIOGRAPHY,  PERIPHERAL ANGIOGRAPHY, ANGIOPLASTY (PTCA/PTA) OR INSERTION  OF STENT IN THE CORONARY, CAROTID, AND/OR PERIPHERAL ARTERIES      Activity:   DO NOT drive after the procedure. You may resume driving late the following day according to the nurse or physician’s instructions   Plan on resting and relaxing tonight and tomorrow   Resume your normal activity after 48 hours, or as instructed by your physician   Do not lift anything over 10 pounds for the next 24 hours   Avoid sexual activity for the next 24 hours   Avoid drinking alcohol for the next 24 hours   If the groin site was used, avoid repeated stair use and excessive walking for the next 24 hours         What is Normal?   A small lump at the procedure site associated with mild tenderness when touched   The procedure site may be bruised or discolored   There may be a small amount of drainage on the bandage    Special Instructions:   Drink plenty of fluids during the next 24 hours to “flush” the contrast from your system   The bandage is to remain in place for 24 hours   Keep the bandage clean and dry   DO NOT submerge the procedure site for 72 hours (no bath tubs or pools).   After 24 hours, you must remove the bandage   You should shower after removing the bandage, and wash the procedure site gently with soap and water   If you choose to wear a bandage for a few days, make sure it remains clean and dry and that it is changed daily    When you should NOTIFY YOUR PHYSICIAN:   Bleeding can occur at the procedure site - both on the outside of the skin and/or beneath the surface of the skin   Swelling or a large lump at the procedure site can occur, which may be accompanied by moderate to severe pain. If either of the above occurs, lie down flat. Have someone apply pressure to the procedure site with both hands, as instructed by the nurse. Hold pressure for 20 minutes and the bleeding should stop. Notify your  physician of the occurrence. If the bleeding does not stop, call 911 and continue to apply pressure   If you experience signs of a fever, temperature >101 degrees, chills, infection (redness, swelling, thick yellow drainage, or a foul odor from the procedure site)   If you notice any numbness, tingling, or loss of feeling to your leg or foot for groin access      Other:  - You may resume your present diet, unless otherwise specified by your physician.   - You may resume all of your medications as prescribed, unless otherwise directed by your physician. A list of your medications was provided to you at discharge.  - Please call your physician's office for a follow-up appointment. You should be seen in 2 weeks.  - Do not make any personal/business decisions and/or sign any legal documents for the next 24 hours.       Continue clopidogrel  Start xarelto 2.5 mg twice daily

## 2025-03-20 NOTE — BRIEF OP NOTE
Pre-Operative Diagnosis: 1.  Atherosclerosis with claudication right lower extremity 2.  Chronic stent occlusion right lower extremity     Post-Operative Diagnosis: 1.  Atherosclerosis with claudication right lower extremity 2.  Chronic stent occlusion right lower extremity     Procedure Performed:   1. US percutaneous access left common femoral artery   2. Selection of right common femoral artery and angiogram   3.  Ultrasound-guided percutaneous access right dorsalis pedis  4.  Crossing of chronic total occlusion of proximal superficial femoral artery and occluded mid SFA stent and subintimal plane parallel to stent  5.  Balloon angioplasty and stent of proximal superficial femoral artery to proximal popliteal artery with following: 7 x 140 Zilver, 7 x 140 Zilver, 7 x 80 Zilver, 7 x 60 bio mimics    Anesthesia: Moderate conscious sedation with 6 mg of Versed and 300 mcg of fentanyl start time 1110, finish 1313 for a total of 123 minutes    Assistant(s):        Surgical Findings:   1.  Right common femoral and profunda patent, superficial femoral artery occluded shortly after its origin to the level of the mid SFA stent-treated with angioplasty and stenting in a subintimal plane parallel to the previously occluded stent, popliteal artery patent, peroneal patent to the ankle, dominant runoff is the posterior tibial artery to the foot, dorsalis pedis is small but patent     Specimen: None     Estimated Blood Loss: 50 mL    Davey Turk MD  3/20/2025  1:24 PM

## 2025-03-21 ENCOUNTER — PATIENT OUTREACH (OUTPATIENT)
Dept: CASE MANAGEMENT | Age: 75
End: 2025-03-21

## 2025-03-21 NOTE — PROGRESS NOTES
Discharged 3/20 [facility]      Vascular Request :  Dr. Davey Turk in 2 weeks   [facility] [address] [facility] [address]   [phone]                Dr. Davey Turk in 3 weeks office will call you will follow up appointment date and time.   [facility] [address] [facility] [address]   [phone]          Attempt #1:  Left message on voicemail for patient to call transitions specialist back to schedule follow up appointments. Provided Transitions specialist scheduling phone number [phone].

## 2025-03-24 NOTE — PROGRESS NOTES
Discharged 3/[facility]        Vascular Request :  Dr. Davey Turk in 2 weeks   [facility] [address] [facility] [address]   [phone]    Friday 4/04 @9:20am w/ Stefania Raymundo NP (existing 2wk follow-up appt)              PCP Request :  Deejay Berumen MD   [address] [facility]   [address]   [place] [address]   [phone] (Work)   Today, Monday 3/24 @1pm w/ Margaux SANCHEZ (exisitng appt), pt refused a PCP appt, he states that he was seen today due to some complications that he was having.  He will continue to follow-up with his Vascular team      Spoke with pt who confirmed that he had just been seen today due to some complications.  He stated that he didn't want to follow-up with his PCP, that he will continue his follow-up appts with his Vascular team.  The next appt is Friday 4/04.        Closing encounter

## (undated) DEVICE — Device: Brand: DEFENDO AIR/WATER/SUCTION AND BIOPSY VALVE

## (undated) DEVICE — CAUTERY BLADE 2IN INS E1455

## (undated) DEVICE — Device

## (undated) DEVICE — ENDOSCOPY PACK - LOWER: Brand: MEDLINE INDUSTRIES, INC.

## (undated) DEVICE — 1200CC GUARDIAN II: Brand: GUARDIAN

## (undated) DEVICE — CLIP LGT 11MM OPEN 2.8MM 235CM

## (undated) DEVICE — SUTURE VICRYL 0 CT-1

## (undated) DEVICE — PROXIMATE SKIN STAPLERS (35 WIDE) CONTAINS 35 STAINLESS STEEL STAPLES (FIXED HEAD): Brand: PROXIMATE

## (undated) DEVICE — FLOSEAL HEMOSTATIC MATRIX, 5ML: Brand: FLOSEAL HEMOSTATIC MATRIX

## (undated) DEVICE — FORCEP BIOPSY RJ4 LG CAP W/ND

## (undated) DEVICE — REM POLYHESIVE ADULT PATIENT RETURN ELECTRODE: Brand: VALLEYLAB

## (undated) DEVICE — LAMINECTOMY: Brand: MEDLINE INDUSTRIES, INC.

## (undated) DEVICE — SUTURE VICRYL 2-0 JJ42G

## (undated) DEVICE — ELEVIEW INJ AGENT USED FOR ESD

## (undated) DEVICE — INSULATED BLADE ELECTRODE 6.5

## (undated) DEVICE — NEEDLE CONTRAST INTERJECT 25G

## (undated) DEVICE — 11.1-M5 MULTIMODALITY KIT 5

## (undated) DEVICE — ADHESIVE MASTISOL 2/3CC VL

## (undated) DEVICE — ORISE GEL

## (undated) DEVICE — FIAPC® PROBE W/ FILTER 2200 SC OD 2.3MM/6.9FR; L 2.2M/7.2FT: Brand: ERBE

## (undated) DEVICE — ENDOSCOPY PACK UPPER: Brand: MEDLINE INDUSTRIES, INC.

## (undated) DEVICE — Device: Brand: BALLOON3

## (undated) DEVICE — CANNULA NASAL 02/C02 ADULT

## (undated) DEVICE — SOL  .9 1000ML BTL

## (undated) DEVICE — FORCEP RADIAL JAW 4

## (undated) DEVICE — GAMMEX® PI HYBRID SIZE 8.5, STERILE POWDER-FREE SURGICAL GLOVE, POLYISOPRENE AND NEOPRENE BLEND: Brand: GAMMEX

## (undated) DEVICE — 3M™ TEGADERM™ TRANSPARENT FILM DRESSING, 1626W, 4 IN X 4-3/4 IN (10 CM X 12 CM), 50 EACH/CARTON, 4 CARTON/CASE: Brand: 3M™ TEGADERM™

## (undated) DEVICE — FILTERLINE NASAL ADULT O2/CO2

## (undated) DEVICE — SUCTION CANISTER, 3000CC,SAFELINER: Brand: DEROYAL

## (undated) DEVICE — 3M™ RED DOT™ MONITORING ELECTRODE WITH FOAM TAPE AND STICKY GEL, 50/BAG, 20/CASE, 72/PLT 2570: Brand: RED DOT™

## (undated) DEVICE — SURGICEL FIBULAR 2X4

## (undated) DEVICE — CLIP HEMOSTASIS LOCKADO 16X235

## (undated) DEVICE — DRAPE SHEET LG

## (undated) DEVICE — C-ARMOR C-ARM EQUIPMENT COVERS CLEAR STERILE UNIVERSAL FIT 12 PER CASE: Brand: C-ARMOR

## (undated) DEVICE — DRAPE SLUSH/WARMER W/DISC

## (undated) DEVICE — SNARE CAPTI HEX STIFF SMALL

## (undated) DEVICE — NV CLIP DSC&IN-LINE ACTIVATOR

## (undated) DEVICE — MICRO KOVER: Brand: UNBRANDED

## (undated) DEVICE — 3.0MM PRECISION NEURO (MATCH HEAD)

## (undated) DEVICE — GAUZE SPONGES,12 PLY: Brand: CURITY

## (undated) DEVICE — PACK SRG UNV 1 STRL LF

## (undated) DEVICE — SNARE CAPTIFLEX MICRO-OVL OLY

## (undated) DEVICE — AIRLIFE™ ADULT OXYGEN MASK VINYL, UNDER-THE-CHIN STYLE, MEDIUM CONCENTRATION MASK WITH 7 FEET (2.1 M) CRUSH-RESISTANT OXYGEN TUBING: Brand: AIRLIFE™

## (undated) DEVICE — SUTURE MONOCRYL 3-0 Y936H

## (undated) DEVICE — GLOVE SURG SENSICARE SZ 7

## (undated) NOTE — LETTER
OSR/BRUCE Notification    To: Dr. Malgorzata Ly Date:  12/17/2021          Patient Name: Mckenzie Figueroa / Sex: 8/8/1950-A: 70 y  male      CSN: [id number]      Medical Records: [id number]    Surgeon(s):  Surgeon(s):  Reggie Goodell, MD

## (undated) NOTE — IP AVS SNAPSHOT
[facility]            (For Outpatient Use Only) Initial Admit Date: 8/9/2021   Inpt/Obs Admit Date: Inpt: 8/9/21 / Obs: N/A   Discharge Date:    Cande Davis:  [de-identified]   MRN: [de-identified]   CSN: [id number]   CEID: [id number]        AL Subscriber Name:  Ed Ramirez :    Subscriber ID:  Pt Rel to Subscriber:    Hospital Account Financial Class: Medicare Advantage    2021

## (undated) NOTE — IP AVS SNAPSHOT
Patient Demographics     Address  [address] [address] Phone  [phone] [facility])  [phone] (Mobile) *Preferred* E-mail Address  [email or web address]      Emergency Contact(s)     Name Relation Home Work Mobile    Roseann Burden your hips. This makes rising from a seated position more feasible. • Avoid overstuffed or plush chairs. Medium to firm chairs with straight backs give better support. • Recliners are OK but you may need to add pillows to avoid sinking into the chair. the position that provides you the most comfort. • You may use a pillow under knees, between legs, or behind back (if lying on your side), for comfort. • Log roll to turn and rise from a recumbent position. • You may have difficulty sleeping at night. at the dressing edges. If the bandage integrity is compromised, please apply an additional adhesive over the existing dressing. • Do not submerge your wound.  No tub bathing, swimming, use of steam rooms or saunas for 6 weeks following surgery and when pe improve over time. • It is not uncommon 48-72 hours after surgery for patients to experience worsening pain symptoms or return of leg pain/symptoms as post-surgical inflammation sets in. Do not be alarmed as this should gradually improve.    Non-medicatio Gradually wean yourself from prescription medication. You may substitute for Tylenol (acetaminophen). • Consider taking pain medication 30 minutes prior to activity to avoid incisional pain.    • Take muscle relaxants as needed only if experiencing muscle unexplained anxiety with breathing      Follow-up Information     Tad Rothman MD. Schedule an appointment as soon as possible for a visit in 1 week.     Specialty: Internal Medicine  Contact information:  [facility]  [address] [facility] [address] [phone], [phone]  [address] [facility]    Phone: [phone]   docusate sodium 100 MG Caps     Please  your prescriptions at the location directed by your doctor or nurse    Shonna Consults signed by Doretha Chaney MD at 8/10/2021 10:37 AM     Author: Doretha Chaney MD Service: Hospitalist Author Type: Physician    Filed: 8/10/2021 10:37 AM Date of Service: 8/10/2021  9:50 AM Status: Signed    : Doretha Chaney MD (Physician)     Consult Or 01/2019    GSAM- rectal mass; polyps- repeat colon 1 yr   • COLOSTOMY      reversed   • HC TRANSLUMINAL ANGIOPLASTY W STENT ILIAC EA ADDL      iliac stent   • OTHER      right iliac artery stent   • OTHER      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   Including negative for chest pain, shortness of breat HLD, HTN, hs of DVT, atherosclerosis of aorta, who presents for lumbar fusion.      Lumbar fusion  - oral meds for pain control    - Adv diet, zofran for nausea, OBR  - PT/OT/SW  - monitor for acute blood loss anemia, hgb stable  - SCD for DVT prophy  - fur mask and gloves. Patient was wearing a mask during session. Patient presented in bed with 5/10 pain. Patient with good  progress towards goals during this session.  Education provided on Spine precautions, Physical therapy plan of care and physiological daljit ASSESSMENT   Ratin  Location: back  Management Techniques: Activity promotion; Body mechanics; Relaxation;Repositioning    BALANCE                                                                                                                       Stati walker - rolling at assistance level: supervision   Goal #3   Current Status Min A RW   Goal #4 Patient will negotiate one curb w/ assistive device and minimal assistance   Goal #4   Current Status NT   Goal #5 Patient to demonstrate independence with home the chair with all needs within reach and alarm system activated. Recommend a rehab facility at this time as pt is below his base line and lives alone with no assist.     Bed mobility: Mod assist  Transfers:  Mod assist  Gait Assistance: Minimum assistance etc.): A Lot   -   Moving from lying on back to sitting on the side of the bed?: A Lot   How much help from another person does the patient currently need. ..   -   Moving to and from a bed to a chair (including a wheelchair)?: A Little   -   Need to walk i (Physical Therapist)       PHYSICAL THERAPY TREATMENT NOTE - INPATIENT     Room Number: 407/407-A       Presenting Problem: RLE radiculopathy, s/p L5-S1 MIS-TLIF    Problem List  Active Problems:    Radiculopathy      PHYSICAL THERAPY ASSESSMENT     Phoebe Automatic  Patient Position: Lying    O2 WALK  Oxygen Therapy  SPO2% on Oxygen at Rest: 96  At rest oxygen flow (liters per minute): 2  SPO2% Ambulation on Room Air: 94    AM-PAC '6-Clicks' INPATIENT SHORT FORM - BASIC MOBILITY  How much difficulty does th is able to ambulate 100 feet with assist device: walker - rolling at assistance level: supervision   Goal #3   Current Status Min A RW   Goal #4 Patient will negotiate one curb w/ assistive device and minimal assistance   Goal #4   Current Status NT   Goal is the current recommendation. RN approves participation in therapy session, seen in coordination with OT. Patient is alert but impulsive during session. He is educated regarding spinal precautions and log roll technique but will need ongoing training. diagnostic radiation    • GERD (gastroesophageal reflux disease)    • Heart attack ([facility] [address]     unsure when   • High blood pressure    • High cholesterol    • MRSA (methicillin resistant Staphylococcus aureus) 2020    pt not aware where located   • Neuropathy Risk: High fall risk[MJ.2]    WEIGHT BEARING RESTRICTION[MJ.1]  Weight Bearing Restriction: None[MJ.2]                PAIN ASSESSMENT[MJ.1]  Ratin  Location: low back  Management Techniques: Activity promotion; Body mechanics; Relaxation;Repositioning; Ot Modifier (G-Code): CL[MJ.2]    FUNCTIONAL ABILITY STATUS  Gait Assessment[MJ.1]   Gait Assistance: Minimum assistance  Distance (ft): 50'c2  Assistive Device: Rolling walker  Pattern: Shuffle;R Foot drag  Stoop/Curb Assistance: Not tested  Comment : Phoebe 4:40 PM                        Occupational Therapy Notes (last 72 hours) (Notes from 8/10/2021  1:21 PM through 8/13/2021  1:21 PM)      Occupational Therapy Note signed by Favian Green OT at 8/10/2021  4:59 PM  Version 1 of 1    Author: Jeannine Colon numbness that he reports as being painful at times when bumped. Pt would benefit from continued skilled OT to address deficits.  Recommendation at this time is for continued IP rehab in a subacute setting to maximize independence and mobility prior to retur Surgical History:   Procedure Laterality Date   • COLONOSCOPY N/A 8/7/2018    Procedure: COLONOSCOPY;  Surgeon: Vernell Menendez MD;  Location: [address] [place] [address] ENDOSCOPY   • COLONOSCOPY  01/2019    GSAM- rectal mass; polyps- repeat colon 1 yr   • COLOSTOMY      reve safety awareness    RANGE OF MOTION   Upper extremity ROM is within functional limits     STRENGTH ASSESSMENT  Upper extremity strength is within functional limits     ACTIVITIES OF DAILY LIVING ASSESSMENT  AM-PAC ‘6-Clicks’ Inpatient Daily Activity Short  on:21  Frequency:3x/week[JOSE CRUZ.1]     Attribution Key    JOSE CRUZ. 1 - Janyth Home, OT on 8/10/2021  4:41 PM  Allison Blocker. 2 - Janyth Home, OT on 8/10/2021  4:42 PM                     Video Swallow Study Notes    No notes of this type exist for this enc

## (undated) NOTE — LETTER
[address], [place]  Authorization for Invasive Procedures  1.  I hereby authorize Dr. Claudetta Sexton , my physician and whomever may be designated as the doctor's assistant, to perform the following operation and/or procedure:  Right low 4. Should the need arise during my operation or immediate post-operative period; I also consent to the administration of blood and/or blood products.  Further, I understand that despite careful testing and screening of blood and blood products, I may still 9. Patients having a sterilization procedure: I understand that if the procedure is successful the results will be permanent and it will therefore be impossible for me to inseminate, conceive or bear children.  I also understand that the procedure is intend